# Patient Record
Sex: FEMALE | Race: WHITE | Employment: OTHER | URBAN - METROPOLITAN AREA
[De-identification: names, ages, dates, MRNs, and addresses within clinical notes are randomized per-mention and may not be internally consistent; named-entity substitution may affect disease eponyms.]

---

## 2021-06-17 ENCOUNTER — TRANSCRIBE ORDER (OUTPATIENT)
Dept: SCHEDULING | Age: 81
End: 2021-06-17

## 2021-06-17 DIAGNOSIS — R10.13 ABDOMINAL PAIN, EPIGASTRIC: Primary | ICD-10-CM

## 2021-06-24 ENCOUNTER — HOSPITAL ENCOUNTER (OUTPATIENT)
Dept: CT IMAGING | Age: 81
Discharge: HOME | End: 2021-06-24
Payer: MEDICARE

## 2021-06-24 DIAGNOSIS — R10.13 ABDOMINAL PAIN, EPIGASTRIC: ICD-10-CM

## 2021-06-24 PROCEDURE — 74160 CT ABDOMEN W/CONTRAST: CPT

## 2021-06-24 PROCEDURE — 74011000636 HC RX REV CODE- 636: Performed by: RADIOLOGY

## 2021-06-24 RX ADMIN — IOPAMIDOL 100 ML: 755 INJECTION, SOLUTION INTRAVENOUS at 13:30

## 2023-11-20 ENCOUNTER — HOSPITAL ENCOUNTER (OUTPATIENT)
Facility: HOSPITAL | Age: 83
Discharge: HOME | End: 2023-11-23
Payer: MEDICARE

## 2023-11-20 DIAGNOSIS — E78.5 HYPERLIPIDEMIA, UNSPECIFIED HYPERLIPIDEMIA TYPE: ICD-10-CM

## 2023-11-20 DIAGNOSIS — R94.5 ABNORMAL FINDING ON LIVER FUNCTION: ICD-10-CM

## 2023-11-20 DIAGNOSIS — K74.00 HEPATIC FIBROSIS, UNSPECIFIED: ICD-10-CM

## 2023-11-20 PROCEDURE — 76700 US EXAM ABDOM COMPLETE: CPT

## 2024-01-18 ENCOUNTER — TRANSCRIBE ORDERS (OUTPATIENT)
Facility: HOSPITAL | Age: 84
End: 2024-01-18

## 2024-01-18 DIAGNOSIS — R74.8 ACID PHOSPHATASE ELEVATED: Primary | ICD-10-CM

## 2024-01-26 ENCOUNTER — HOSPITAL ENCOUNTER (OUTPATIENT)
Facility: HOSPITAL | Age: 84
End: 2024-01-26
Attending: INTERNAL MEDICINE
Payer: MEDICARE

## 2024-01-26 DIAGNOSIS — R74.8 INCREASED LIVER ENZYMES: ICD-10-CM

## 2024-01-26 PROCEDURE — 76981 USE PARENCHYMA: CPT

## 2024-02-16 ENCOUNTER — HOSPITAL ENCOUNTER (OUTPATIENT)
Facility: HOSPITAL | Age: 84
End: 2024-02-16
Attending: INTERNAL MEDICINE
Payer: MEDICARE

## 2024-02-16 DIAGNOSIS — K74.00 LIVER FIBROSIS: ICD-10-CM

## 2024-02-16 PROCEDURE — 74183 MRI ABD W/O CNTR FLWD CNTR: CPT

## 2024-02-16 PROCEDURE — A9575 INJ GADOTERATE MEGLUMI 0.1ML: HCPCS | Performed by: INTERNAL MEDICINE

## 2024-02-16 PROCEDURE — 6360000004 HC RX CONTRAST MEDICATION: Performed by: INTERNAL MEDICINE

## 2024-02-16 RX ORDER — GADOTERATE MEGLUMINE 376.9 MG/ML
10 INJECTION INTRAVENOUS ONCE
Status: COMPLETED | OUTPATIENT
Start: 2024-02-16 | End: 2024-02-16

## 2024-02-16 RX ADMIN — GADOTERATE MEGLUMINE 10 ML: 376.9 INJECTION, SOLUTION INTRAVENOUS at 14:03

## 2024-11-27 ENCOUNTER — OFFICE VISIT (OUTPATIENT)
Age: 84
End: 2024-11-27

## 2024-11-27 VITALS
HEART RATE: 77 BPM | WEIGHT: 74.4 LBS | TEMPERATURE: 98.5 F | DIASTOLIC BLOOD PRESSURE: 65 MMHG | HEIGHT: 62 IN | OXYGEN SATURATION: 100 % | BODY MASS INDEX: 13.69 KG/M2 | SYSTOLIC BLOOD PRESSURE: 159 MMHG

## 2024-11-27 DIAGNOSIS — E03.9 PRIMARY HYPOTHYROIDISM: Primary | ICD-10-CM

## 2024-11-27 DIAGNOSIS — I10 PRIMARY HYPERTENSION: ICD-10-CM

## 2024-11-27 RX ORDER — LEVOTHYROXINE SODIUM 50 UG/1
TABLET ORAL
Qty: 90 TABLET | Refills: 3 | Status: SHIPPED | OUTPATIENT
Start: 2024-11-27 | End: 2024-11-27 | Stop reason: SDUPTHER

## 2024-11-27 RX ORDER — LEVOTHYROXINE SODIUM 50 UG/1
50 TABLET ORAL DAILY
Qty: 30 TABLET | Refills: 3 | Status: SHIPPED | OUTPATIENT
Start: 2024-11-27 | End: 2024-11-27 | Stop reason: SDUPTHER

## 2024-11-27 RX ORDER — LEVOTHYROXINE SODIUM 50 UG/1
50 TABLET ORAL DAILY
Qty: 90 TABLET | Refills: 3 | Status: SHIPPED | OUTPATIENT
Start: 2024-11-27 | End: 2024-11-27 | Stop reason: SDUPTHER

## 2024-11-27 RX ORDER — DENOSUMAB 60 MG/ML
INJECTION SUBCUTANEOUS
COMMUNITY

## 2024-11-27 RX ORDER — ACYCLOVIR 400 MG/1
TABLET ORAL
COMMUNITY

## 2024-11-27 RX ORDER — LEVOTHYROXINE SODIUM 50 UG/1
TABLET ORAL
Qty: 90 TABLET | Refills: 3 | Status: SHIPPED | OUTPATIENT
Start: 2024-11-27

## 2024-11-27 RX ORDER — IRON POLYSACCHARIDE COMPLEX 150 MG
1 CAPSULE ORAL
COMMUNITY

## 2024-11-27 NOTE — PROGRESS NOTES
Shauna Cunningham is a 84 y.o. female here for   Chief Complaint   Patient presents with    New Patient    Thyroid Problem       1. Have you been to the ER, urgent care clinic since your last visit?  Hospitalized since your last visit? -no    2. Have you seen or consulted any other health care providers outside of the [facility] since your last visit?  Include any pap smears or colon screening.-  no

## 2024-11-27 NOTE — PROGRESS NOTES
[facility] DIABETES AND ENDOCRINOLOGY               Maria Ines Avila MD        Patient Information  Name : Shauna Cunningham 84 y.o.     YOB: 1940         Referred by: Logan Sanchez MD       The patient (or guardian, if applicable) and other individuals in attendance with the patient were advised that Artificial Intelligence will be utilized during this visit to record, process the conversation to generate a clinical note, and support improvement of the AI technology. The patient (or guardian, if applicable) and other individuals in attendance at the appointment consented to the use of AI, including the recording.      Chief Complaint   Patient presents with    New Patient    Thyroid Problem       History of present illness    Shauna Cunningham is a 84 y.o. female  here for evaluation of thyroid.    History of Present Illness    She has managed hypothyroidism with levothyroxine for 20 years: 75 mcg four days a week and 50 mcg three days a week, taken on an empty stomach.    Experiencing weight loss attributed to sarcoidosis. Usual weight is 89 pounds, recently improved appetite, increasing vegetables, chicken, and fruits in diet.    History of sarcoidosis, currently in flare-up, managed with prednisone. Unsure about high calcium related to sarcoidosis. No nausea, vomiting, diarrhea, gluten sensitivity, celiac disease, or IBS. Mild ankle swelling.    FAMILY HISTORY  - Sister has thyroid problem  - No family history of thyroid cancer    Reviewed last 2 thyroid blood test, TSH has been high  No diarrhea, no known malabsorption  Did not miss the medications      Wt Readings from Last 3 Encounters:   11/27/24 33.7 kg (74 lb 6.4 oz)       Past Medical History:   Diagnosis Date    Hyperlipidemia     Hypertension        Current Outpatient Medications   Medication Sig    denosumab (PROLIA) 60 MG/ML SOSY SC injection Inject into the skin    iron polysaccharides (NIFEREX) 150 MG capsule 1 capsule    acyclovir

## 2025-01-02 ENCOUNTER — LAB (OUTPATIENT)
Age: 85
End: 2025-01-02

## 2025-01-02 DIAGNOSIS — E03.9 PRIMARY HYPOTHYROIDISM: ICD-10-CM

## 2025-01-03 PROBLEM — E78.5 HYPERLIPIDEMIA: Status: ACTIVE | Noted: 2025-01-03

## 2025-01-03 PROBLEM — I35.1 AORTIC VALVE REGURGITATION: Status: ACTIVE | Noted: 2025-01-03

## 2025-01-03 PROBLEM — I10 BENIGN ESSENTIAL HYPERTENSION: Status: ACTIVE | Noted: 2025-01-03

## 2025-01-03 PROBLEM — I31.39 PERICARDIAL EFFUSION: Status: ACTIVE | Noted: 2025-01-03

## 2025-01-03 PROBLEM — D86.0 PULMONARY SARCOIDOSIS (HCC): Status: ACTIVE | Noted: 2025-01-03

## 2025-01-03 PROBLEM — I25.10 CORONARY ARTERIOSCLEROSIS IN NATIVE ARTERY: Status: ACTIVE | Noted: 2025-01-03

## 2025-01-03 PROBLEM — K55.9 VASCULAR INSUFFICIENCY OF INTESTINE (HCC): Status: ACTIVE | Noted: 2025-01-03

## 2025-01-03 PROBLEM — E03.9 HYPOTHYROIDISM: Status: ACTIVE | Noted: 2025-01-03

## 2025-01-03 LAB
T4 FREE SERPL-MCNC: 0.9 NG/DL (ref 0.8–1.5)
TSH SERPL DL<=0.05 MIU/L-ACNC: 55.3 UIU/ML (ref 0.36–3.74)

## 2025-01-03 NOTE — RESULT ENCOUNTER NOTE
Has she taken the medication Unithroid brand name consistently as per prescription  If she has taken consistently then increase Unithroid to 100 mcg daily, she can double the dose of 50 mcg and we have to recheck the blood test after 4 weeks, TSH, free T4    If she has not taken the medication consistently, it is very important for her health to take the medication and then we have to repeat the blood test after 4 weeks

## 2025-01-06 ENCOUNTER — TELEPHONE (OUTPATIENT)
Age: 85
End: 2025-01-06

## 2025-01-06 NOTE — TELEPHONE ENCOUNTER
Per Dr. Avila, informed pt of result note, as noted above. Pt verbalized understanding and stated she did not have the Unithroid for a couple of weeks as the pharmacy was not able to get it in. She has an appt on Friday and will set up lab appt then.

## 2025-01-06 NOTE — TELEPHONE ENCOUNTER
----- Message from Dr. Maria Ines Avila MD sent at 1/3/2025  8:47 AM EST -----  Has she taken the medication Unithroid brand name consistently as per prescription  If she has taken consistently then increase Unithroid to 100 mcg daily, she can double the dose of 50 mcg and we have to recheck the blood test after 4 weeks, TSH, free T4    If she has not taken the medication consistently, it is very important for her health to take the medication and then we have to repeat the blood test after 4 weeks

## 2025-01-10 ENCOUNTER — OFFICE VISIT (OUTPATIENT)
Age: 85
End: 2025-01-10
Payer: MEDICARE

## 2025-01-10 VITALS
HEART RATE: 68 BPM | OXYGEN SATURATION: 100 % | BODY MASS INDEX: 14.17 KG/M2 | RESPIRATION RATE: 20 BRPM | TEMPERATURE: 97.5 F | WEIGHT: 77 LBS | HEIGHT: 62 IN

## 2025-01-10 DIAGNOSIS — E03.9 PRIMARY HYPOTHYROIDISM: ICD-10-CM

## 2025-01-10 DIAGNOSIS — E03.9 ACQUIRED HYPOTHYROIDISM: Primary | ICD-10-CM

## 2025-01-10 PROCEDURE — 99214 OFFICE O/P EST MOD 30 MIN: CPT | Performed by: INTERNAL MEDICINE

## 2025-01-10 NOTE — PROGRESS NOTES
Shauna Cunningham is a 84 y.o. female here for   Chief Complaint   Patient presents with    Thyroid Problem       1. Have you been to the ER, urgent care clinic since your last visit?  Hospitalized since your last visit? -no    2. Have you seen or consulted any other health care providers outside of the [facility] since your last visit?  Include any pap smears or colon screening.-no    
after 01/20/2025 to assess thyroid levels  - Continue Unithroid sublingually, ensure no generic substitution  - Lab slip provided, will contact with results  - Adjust dosage if thyroid hormone levels are insufficient      2. Sarcoidosis - Currently experiencing flare-up, managed with prednisone. Reports weight loss associated with sarcoidosis.    3.  Osteoporosis: Underweight  Risk factors, age, sarcoidosis, chronic prednisone  On denosumab managed by rheumatology per patient      4 HTN - cuff was too large            No follow-up provider specified.    Thank you for allowing me to participate in the care of this patient.    Maria Ines Avila MD      Patient /caregiver verbalized understanding  Voice-recognition software was used to generate this report, which may result in some phonetic-based errors in the grammar and contents.  Even though attempts were made to correct all the mistakes, some may have been missed and remained in the body of the report.

## 2025-01-11 LAB
T4 FREE SERPL-MCNC: 0.9 NG/DL (ref 0.8–1.5)
TSH SERPL DL<=0.05 MIU/L-ACNC: 81.4 UIU/ML (ref 0.36–3.74)

## 2025-01-14 ENCOUNTER — TELEPHONE (OUTPATIENT)
Age: 85
End: 2025-01-14

## 2025-01-14 DIAGNOSIS — E03.9 ACQUIRED HYPOTHYROIDISM: Primary | ICD-10-CM

## 2025-01-14 RX ORDER — LEVOTHYROXINE SODIUM 100 UG/1
100 TABLET ORAL DAILY
Qty: 90 TABLET | Refills: 3 | Status: SHIPPED | OUTPATIENT
Start: 2025-01-14

## 2025-01-14 NOTE — TELEPHONE ENCOUNTER
----- Message from Dr. Maria Ines Avila MD sent at 1/13/2025  4:54 PM EST -----  Not sure what is going on with the thyroid, she reports to be taking the Unithroid however TSH continues to increase    Please call the pharmacy to see if she got Unithroid 50 mcg, brand      If she got 50 mcg then I have to increase to 100 mcg Unithroid

## 2025-01-14 NOTE — TELEPHONE ENCOUNTER
Per Dr. Avila, informed pt of result note, as noted above. Pt verbalized understanding with no further questions or concerns at this time.

## 2025-01-14 NOTE — TELEPHONE ENCOUNTER
Called [facility] and confirmed pt is received brand Unithroid 50 mcg tab.     Attempted to call pt. Answer, but no response? Will send [employer] msg.

## 2025-01-17 ENCOUNTER — TELEPHONE (OUTPATIENT)
Age: 85
End: 2025-01-17

## 2025-01-17 DIAGNOSIS — E03.9 ACQUIRED HYPOTHYROIDISM: Primary | ICD-10-CM

## 2025-01-17 NOTE — TELEPHONE ENCOUNTER
Attempted to call. Unsuccessful. Left msg for Shauna Cunningham to give us a call back at the office. A callback number was left.

## 2025-01-17 NOTE — TELEPHONE ENCOUNTER
Pt stated legs and feet are swelling and BP is running very high. Denies chest pain. Stated the swelling started before she double the 50 mcg Unithroid tab, but the BP didn't increase until after. Would like to speak to Dr. Avila. Advised her she is in clinic and will call after. Pt advised to seek ED should sxs worsen or she develops any chest pain. Pt verbalized understanding with no further questions or concerns at this time.

## 2025-01-17 NOTE — TELEPHONE ENCOUNTER
Increased unithroid and now legs and feet are swelling and states blood pressure is elevated. Asked what her reading was could only state systolic is around 170

## 2025-01-17 NOTE — TELEPHONE ENCOUNTER
She is not getting enough thyroid medicine based on the blood test, hence we had to increase the dose    How high is the blood pressure - need BP as well as pulse readings for 2 days ? Does she live alone ? Compliance of the medications ?    Confirm the blood pressure medication she is on, (chlorthalidone 25 mg, atenolol half a tablet twice daily, amlodipine usually is given once a day not 3 times a day)

## 2025-01-20 RX ORDER — LEVOTHYROXINE SODIUM 100 UG/1
100 TABLET ORAL DAILY
Qty: 90 TABLET | Refills: 2 | Status: SHIPPED | OUTPATIENT
Start: 2025-01-20

## 2025-01-20 NOTE — TELEPHONE ENCOUNTER
She wants to go back on LT4 , not unithroid   BP is better  She is on Amlodipine 2.5 mg TID , says that is how she was instructed to take as she could not take 10 mg   - could cause pedal edema    Stockings    Switch to Levothyroxine     TSH ,Ft4 , T3 in 4 weeks   Not absorbing well

## 2025-01-20 NOTE — TELEPHONE ENCOUNTER
Pt stated she is still having the swelling, BP has slightly approved but she cannot provide exact number. Taking Amlodipine 25 mg TID, Chlorthalidone 25 mg daily and Atenolol 25 mg 1/2 tab BID. Pt wants to know if she can take Levothyroxine in the higher dose because Unithroid was causing some swelling when she first switched to it and now that she has increased the dose the swelling is worse and she is having skin sensitivity as well.

## 2025-02-25 ENCOUNTER — HOSPITAL ENCOUNTER (OUTPATIENT)
Facility: HOSPITAL | Age: 85
Discharge: HOME | End: 2025-02-27
Payer: MEDICARE

## 2025-02-25 VITALS
WEIGHT: 77 LBS | SYSTOLIC BLOOD PRESSURE: 150 MMHG | BODY MASS INDEX: 14.17 KG/M2 | HEIGHT: 62 IN | DIASTOLIC BLOOD PRESSURE: 70 MMHG

## 2025-02-25 DIAGNOSIS — I50.9 HEART FAILURE, UNSPECIFIED HF CHRONICITY, UNSPECIFIED HEART FAILURE TYPE (HCC): ICD-10-CM

## 2025-02-25 PROCEDURE — 93306 TTE W/DOPPLER COMPLETE: CPT

## 2025-02-27 ENCOUNTER — TELEPHONE (OUTPATIENT)
Age: 85
End: 2025-02-27

## 2025-02-27 ENCOUNTER — OFFICE VISIT (OUTPATIENT)
Age: 85
End: 2025-02-27
Payer: MEDICARE

## 2025-02-27 VITALS
DIASTOLIC BLOOD PRESSURE: 72 MMHG | HEART RATE: 76 BPM | SYSTOLIC BLOOD PRESSURE: 130 MMHG | OXYGEN SATURATION: 97 % | HEIGHT: 62 IN | BODY MASS INDEX: 13.98 KG/M2 | WEIGHT: 76 LBS

## 2025-02-27 DIAGNOSIS — I50.32 CHRONIC DIASTOLIC HEART FAILURE (HCC): Primary | ICD-10-CM

## 2025-02-27 DIAGNOSIS — I70.90 ATHEROSCLEROSIS: ICD-10-CM

## 2025-02-27 DIAGNOSIS — I50.9 HEART FAILURE, UNSPECIFIED HF CHRONICITY, UNSPECIFIED HEART FAILURE TYPE (HCC): ICD-10-CM

## 2025-02-27 DIAGNOSIS — E78.5 HYPERLIPIDEMIA, UNSPECIFIED HYPERLIPIDEMIA TYPE: ICD-10-CM

## 2025-02-27 DIAGNOSIS — I31.39 PERICARDIAL EFFUSION: ICD-10-CM

## 2025-02-27 LAB
ECHO AO ASC DIAM: 2.3 CM
ECHO AO ASCENDING AORTA INDEX: 1.81 CM/M2
ECHO AO ROOT DIAM: 2.6 CM
ECHO AO ROOT INDEX: 2.05 CM/M2
ECHO AR MAX VEL PISA: 2.3 M/S
ECHO AV AREA PEAK VELOCITY: 0.6 CM2
ECHO AV AREA VTI: 0.7 CM2
ECHO AV AREA/BSA PEAK VELOCITY: 0.5 CM2/M2
ECHO AV AREA/BSA VTI: 0.6 CM2/M2
ECHO AV MEAN GRADIENT: 8 MMHG
ECHO AV MEAN VELOCITY: 1.3 M/S
ECHO AV PEAK GRADIENT: 14 MMHG
ECHO AV PEAK VELOCITY: 1.9 M/S
ECHO AV REGURGITANT PHT: 247.9 MS
ECHO AV VELOCITY RATIO: 0.42
ECHO AV VTI: 48.3 CM
ECHO BSA: 1.24 M2
ECHO LA DIAMETER INDEX: 2.36 CM/M2
ECHO LA DIAMETER: 3 CM
ECHO LA TO AORTIC ROOT RATIO: 1.15
ECHO LA VOL A-L A2C: 48 ML (ref 22–52)
ECHO LA VOL A-L A4C: 37 ML (ref 22–52)
ECHO LA VOL BP: 43 ML (ref 22–52)
ECHO LA VOL MOD A2C: 46 ML (ref 22–52)
ECHO LA VOL MOD A4C: 30 ML (ref 22–52)
ECHO LA VOL/BSA BIPLANE: 34 ML/M2 (ref 16–34)
ECHO LA VOLUME AREA LENGTH: 48 ML
ECHO LA VOLUME INDEX A-L A2C: 38 ML/M2 (ref 16–34)
ECHO LA VOLUME INDEX A-L A4C: 29 ML/M2 (ref 16–34)
ECHO LA VOLUME INDEX AREA LENGTH: 38 ML/M2 (ref 16–34)
ECHO LA VOLUME INDEX MOD A2C: 36 ML/M2 (ref 16–34)
ECHO LA VOLUME INDEX MOD A4C: 24 ML/M2 (ref 16–34)
ECHO LV E' LATERAL VELOCITY: 5.35 CM/S
ECHO LV E' SEPTAL VELOCITY: 4.18 CM/S
ECHO LV EDV A2C: 68 ML
ECHO LV EDV A4C: 35 ML
ECHO LV EDV BP: 55 ML (ref 56–104)
ECHO LV EDV INDEX A4C: 28 ML/M2
ECHO LV EDV INDEX BP: 43 ML/M2
ECHO LV EDV NDEX A2C: 54 ML/M2
ECHO LV EF PHYSICIAN: 55 %
ECHO LV EJECTION FRACTION A2C: 83 %
ECHO LV EJECTION FRACTION A4C: 39 %
ECHO LV EJECTION FRACTION BIPLANE: 70 % (ref 55–100)
ECHO LV ESV A2C: 12 ML
ECHO LV ESV A4C: 22 ML
ECHO LV ESV BP: 17 ML (ref 19–49)
ECHO LV ESV INDEX A2C: 9 ML/M2
ECHO LV ESV INDEX A4C: 17 ML/M2
ECHO LV ESV INDEX BP: 13 ML/M2
ECHO LV FRACTIONAL SHORTENING: 30 % (ref 28–44)
ECHO LV INTERNAL DIMENSION DIASTOLE INDEX: 3.15 CM/M2
ECHO LV INTERNAL DIMENSION DIASTOLIC: 4 CM (ref 3.9–5.3)
ECHO LV INTERNAL DIMENSION SYSTOLIC INDEX: 2.2 CM/M2
ECHO LV INTERNAL DIMENSION SYSTOLIC: 2.8 CM
ECHO LV IVSD: 1.3 CM (ref 0.6–0.9)
ECHO LV MASS 2D: 145.6 G (ref 67–162)
ECHO LV MASS INDEX 2D: 114.7 G/M2 (ref 43–95)
ECHO LV POSTERIOR WALL DIASTOLIC: 0.9 CM (ref 0.6–0.9)
ECHO LV RELATIVE WALL THICKNESS RATIO: 0.45
ECHO LVOT AREA: 1.5 CM2
ECHO LVOT AV VTI INDEX: 0.5
ECHO LVOT DIAM: 1.4 CM
ECHO LVOT MEAN GRADIENT: 2 MMHG
ECHO LVOT PEAK GRADIENT: 3 MMHG
ECHO LVOT PEAK VELOCITY: 0.8 M/S
ECHO LVOT STROKE VOLUME INDEX: 29.4 ML/M2
ECHO LVOT SV: 37.4 ML
ECHO LVOT VTI: 24.3 CM
ECHO MV A VELOCITY: 0.94 M/S
ECHO MV E DECELERATION TIME (DT): 138.5 MS
ECHO MV E VELOCITY: 1.13 M/S
ECHO MV E/A RATIO: 1.2
ECHO MV E/E' LATERAL: 21.12
ECHO MV E/E' RATIO (AVERAGED): 24.08
ECHO MV E/E' SEPTAL: 27.03
ECHO PULMONARY ARTERY END DIASTOLIC PRESSURE: 7 MMHG
ECHO PV MAX VELOCITY: 1.1 M/S
ECHO PV PEAK GRADIENT: 4 MMHG
ECHO PV REGURGITANT MAX VELOCITY: 1.3 M/S
ECHO RV FREE WALL PEAK S': 10.1 CM/S
ECHO RV TAPSE: 1.9 CM (ref 1.7–?)
ECHO TV REGURGITANT MAX VELOCITY: 2.05 M/S
ECHO TV REGURGITANT PEAK GRADIENT: 21 MMHG

## 2025-02-27 PROCEDURE — 1090F PRES/ABSN URINE INCON ASSESS: CPT | Performed by: SPECIALIST

## 2025-02-27 PROCEDURE — 1123F ACP DISCUSS/DSCN MKR DOCD: CPT | Performed by: SPECIALIST

## 2025-02-27 PROCEDURE — 1036F TOBACCO NON-USER: CPT | Performed by: SPECIALIST

## 2025-02-27 PROCEDURE — G8419 CALC BMI OUT NRM PARAM NOF/U: HCPCS | Performed by: SPECIALIST

## 2025-02-27 PROCEDURE — 99204 OFFICE O/P NEW MOD 45 MIN: CPT | Performed by: SPECIALIST

## 2025-02-27 PROCEDURE — 93005 ELECTROCARDIOGRAM TRACING: CPT | Performed by: SPECIALIST

## 2025-02-27 PROCEDURE — G8427 DOCREV CUR MEDS BY ELIG CLIN: HCPCS | Performed by: SPECIALIST

## 2025-02-27 PROCEDURE — 93010 ELECTROCARDIOGRAM REPORT: CPT | Performed by: SPECIALIST

## 2025-02-27 PROCEDURE — 3078F DIAST BP <80 MM HG: CPT | Performed by: SPECIALIST

## 2025-02-27 PROCEDURE — 1159F MED LIST DOCD IN RCRD: CPT | Performed by: SPECIALIST

## 2025-02-27 PROCEDURE — 3075F SYST BP GE 130 - 139MM HG: CPT | Performed by: SPECIALIST

## 2025-02-27 PROCEDURE — 1126F AMNT PAIN NOTED NONE PRSNT: CPT | Performed by: SPECIALIST

## 2025-02-27 PROCEDURE — G8400 PT W/DXA NO RESULTS DOC: HCPCS | Performed by: SPECIALIST

## 2025-02-27 RX ORDER — BUMETANIDE 0.5 MG/1
0.5 TABLET ORAL DAILY
COMMUNITY

## 2025-02-27 NOTE — PROGRESS NOTES
Chief Complaint   Patient presents with    HF     Vitals:    02/27/25 1541   BP: 130/72   Site: Left Upper Arm   Position: Sitting   Pulse: 76   SpO2: 97%   Weight: 34.5 kg (76 lb)   Height: 1.575 m (5' 2\")       Chest pain NO     ER, urgent care, or hospitalized outside of [facility] since your last visit?  NO     Refills NO

## 2025-02-27 NOTE — PATIENT INSTRUCTIONS
Patient Education        Learning About the Mediterranean Diet  What is the Mediterranean diet?     The Mediterranean diet is a style of eating rather than a diet plan. It features foods eaten in [place], [place], southern [place] and [place], and other countries along the [place] Sea. It emphasizes eating foods like fish, fruits, vegetables, beans, high-fiber breads and whole grains, nuts, and olive oil. This style of eating includes limited red meat, cheese, and sweets.  Why choose the Mediterranean diet?  A Mediterranean-style diet may improve heart health. It contains more fat than other heart-healthy diets. But the fats are mainly from nuts, unsaturated oils (such as fish oils and olive oil), and certain nut or seed oils (such as canola, soybean, or flaxseed oil). These fats may help protect the heart and blood vessels.  How can you get started on the Mediterranean diet?  Here are some things you can do to switch to a more Mediterranean way of eating.  What to eat  Eat a variety of fruits and vegetables each day, such as grapes, blueberries, tomatoes, broccoli, peppers, figs, olives, spinach, eggplant, beans, lentils, and chickpeas.  Eat a variety of whole-grain foods each day, such as oats, brown rice, and whole wheat bread, pasta, and couscous.  Eat fish at least 2 times a week. Try tuna, salmon, mackerel, lake trout, herring, or sardines.  Eat moderate amounts of low-fat dairy products, such as milk, cheese, or yogurt.  Eat moderate amounts of poultry and eggs.  Choose healthy (unsaturated) fats, such as nuts, olive oil, and certain nut or seed oils like canola, soybean, and flaxseed.  Limit unhealthy (saturated) fats, such as butter, palm oil, and coconut oil. And limit fats found in animal products, such as meat and dairy products made with whole milk. Try to eat red meat only a few times a month in very small amounts.  Limit sweets and desserts to only a few times a week. This includes sugar-sweetened

## 2025-02-27 NOTE — TELEPHONE ENCOUNTER
Spoke to daughter in law she is going to reach out to Shauna to see if she can come in at 4 today or 3/6. Waiting for patient to call back to which appt. She preferred. Trying to fit her in earlier due to echo results. Holding March 6  until we hear back from pt on decision.

## 2025-02-27 NOTE — PROGRESS NOTES
Silvano Aparicio MD. [facility]          Patient: Shauna Cunningham  : 1940      Today's Date: 2025        HISTORY OF PRESENT ILLNESS:     History of Present Illness:  Referred for pericardial effusion   Was seeing Dr. Gongora.    Had a flare of pulmonary sarcoid and is dragging afterwards - took steroids.   Breathing is overall OK.  No sig SOB.  Class 2 SHORE.    No orthopnea.        PAST MEDICAL HISTORY:     Past Medical History:   Diagnosis Date    (HFpEF) heart failure with preserved ejection fraction (HCC)     Atherosclerosis     Hyperlipidemia     Hypertension     Hypothyroidism     PAD (peripheral artery disease)     Pericardial effusion     Pulmonary sarcoidosis              CURRENT MEDICATIONS:    .  Current Outpatient Medications   Medication Sig Dispense Refill    bumetanide (BUMEX) 0.5 MG tablet Take 1 tablet by mouth daily      levothyroxine (SYNTHROID) 100 MCG tablet Take 1 tablet by mouth daily 90 tablet 2    denosumab (PROLIA) 60 MG/ML SOSY SC injection Inject into the skin      iron polysaccharides (NIFEREX) 150 MG capsule 1 capsule      acyclovir (ZOVIRAX) 400 MG tablet Take 1 tablet by mouth as needed      co-enzyme Q-10 30 MG CAPS capsule Take 3 capsules by mouth daily      ammonium lactate (AMLACTIN) 12 % cream Apply 1 Application topically as needed      Evolocumab (REPATHA) SOSY syringe Inject 1 mL into the skin every 30 days      aspirin 81 MG chewable tablet Take 1 tablet by mouth Every Day      vitamin D (VITAMIN D3) 25 MCG (1000 UT) CAPS Take 1 tablet by mouth Every Day      rosuvastatin (CRESTOR) 10 MG tablet Take 1 tablet by mouth nightly      amLODIPine (NORVASC) 2.5 MG tablet 1 tablet Orally THREE TIMES A DAY for 90 days      atenolol (TENORMIN) 25 MG tablet Take 0.5 tablets by mouth 2 times daily at 0800 and 1400      chlorthalidone (HYGROTON) 25 MG tablet Take 1 tablet by mouth daily       No current facility-administered medications for this visit.       Allergies   Allergen

## 2025-03-06 ENCOUNTER — ANCILLARY PROCEDURE (OUTPATIENT)
Age: 85
End: 2025-03-06
Payer: MEDICARE

## 2025-03-06 VITALS
SYSTOLIC BLOOD PRESSURE: 130 MMHG | WEIGHT: 72 LBS | DIASTOLIC BLOOD PRESSURE: 72 MMHG | HEIGHT: 62 IN | BODY MASS INDEX: 13.25 KG/M2

## 2025-03-06 DIAGNOSIS — E78.5 HYPERLIPIDEMIA, UNSPECIFIED HYPERLIPIDEMIA TYPE: ICD-10-CM

## 2025-03-06 DIAGNOSIS — I31.39 PERICARDIAL EFFUSION: ICD-10-CM

## 2025-03-06 DIAGNOSIS — I50.9 HEART FAILURE, UNSPECIFIED HF CHRONICITY, UNSPECIFIED HEART FAILURE TYPE (HCC): ICD-10-CM

## 2025-03-06 DIAGNOSIS — I50.32 CHRONIC DIASTOLIC HEART FAILURE (HCC): ICD-10-CM

## 2025-03-06 DIAGNOSIS — I70.90 ATHEROSCLEROSIS: ICD-10-CM

## 2025-03-06 LAB
ECHO BSA: 1.2 M2
PREALB SERPL-MCNC: 22 MG/DL (ref 9–32)
STRESS TARGET HR: 135 BPM

## 2025-03-06 PROCEDURE — A9538 TC99M PYROPHOSPHATE: HCPCS | Performed by: INTERNAL MEDICINE

## 2025-03-06 RX ADMIN — Medication 16.4 MILLICURIE: at 10:30

## 2025-03-10 ENCOUNTER — RESULTS FOLLOW-UP (OUTPATIENT)
Age: 85
End: 2025-03-10

## 2025-03-10 LAB
ALBUMIN SERPL ELPH-MCNC: 4.2 G/DL (ref 2.9–4.4)
ALBUMIN/GLOB SERPL: 1.3 {RATIO} (ref 0.7–1.7)
ALPHA1 GLOB SERPL ELPH-MCNC: 0.4 G/DL (ref 0–0.4)
ALPHA2 GLOB SERPL ELPH-MCNC: 1 G/DL (ref 0.4–1)
B-GLOBULIN SERPL ELPH-MCNC: 1.1 G/DL (ref 0.7–1.3)
GAMMA GLOB SERPL ELPH-MCNC: 1.1 G/DL (ref 0.4–1.8)
GLOBULIN SER-MCNC: 3.5 G/DL (ref 2.2–3.9)
IGA SERPL-MCNC: 122 MG/DL (ref 64–422)
IGG SERPL-MCNC: 1151 MG/DL (ref 586–1602)
IGM SERPL-MCNC: 41 MG/DL (ref 26–217)
INTERPRETATION SERPL IEP-IMP: ABNORMAL
KAPPA LC FREE SER-MCNC: 24.1 MG/L (ref 3.3–19.4)
KAPPA LC FREE/LAMBDA FREE SER: 1.45 {RATIO} (ref 0.26–1.65)
LABORATORY COMMENT REPORT: ABNORMAL
LAMBDA LC FREE SERPL-MCNC: 16.6 MG/L (ref 5.7–26.3)
M PROTEIN SERPL ELPH-MCNC: ABNORMAL G/DL
PROT SERPL-MCNC: 7.7 G/DL (ref 6–8.5)

## 2025-03-11 ENCOUNTER — OFFICE VISIT (OUTPATIENT)
Age: 85
End: 2025-03-11
Payer: MEDICARE

## 2025-03-11 VITALS
WEIGHT: 76 LBS | TEMPERATURE: 98.6 F | HEIGHT: 62 IN | RESPIRATION RATE: 20 BRPM | BODY MASS INDEX: 13.98 KG/M2 | SYSTOLIC BLOOD PRESSURE: 97 MMHG | DIASTOLIC BLOOD PRESSURE: 73 MMHG | HEART RATE: 70 BPM | OXYGEN SATURATION: 98 %

## 2025-03-11 DIAGNOSIS — E03.9 ACQUIRED HYPOTHYROIDISM: Primary | ICD-10-CM

## 2025-03-11 DIAGNOSIS — I10 ESSENTIAL HYPERTENSION: ICD-10-CM

## 2025-03-11 PROCEDURE — G8400 PT W/DXA NO RESULTS DOC: HCPCS | Performed by: INTERNAL MEDICINE

## 2025-03-11 PROCEDURE — 99214 OFFICE O/P EST MOD 30 MIN: CPT | Performed by: INTERNAL MEDICINE

## 2025-03-11 PROCEDURE — 1090F PRES/ABSN URINE INCON ASSESS: CPT | Performed by: INTERNAL MEDICINE

## 2025-03-11 PROCEDURE — G8427 DOCREV CUR MEDS BY ELIG CLIN: HCPCS | Performed by: INTERNAL MEDICINE

## 2025-03-11 PROCEDURE — 1159F MED LIST DOCD IN RCRD: CPT | Performed by: INTERNAL MEDICINE

## 2025-03-11 PROCEDURE — 1160F RVW MEDS BY RX/DR IN RCRD: CPT | Performed by: INTERNAL MEDICINE

## 2025-03-11 PROCEDURE — 1036F TOBACCO NON-USER: CPT | Performed by: INTERNAL MEDICINE

## 2025-03-11 PROCEDURE — 1123F ACP DISCUSS/DSCN MKR DOCD: CPT | Performed by: INTERNAL MEDICINE

## 2025-03-11 PROCEDURE — 1126F AMNT PAIN NOTED NONE PRSNT: CPT | Performed by: INTERNAL MEDICINE

## 2025-03-11 PROCEDURE — G8419 CALC BMI OUT NRM PARAM NOF/U: HCPCS | Performed by: INTERNAL MEDICINE

## 2025-03-11 PROCEDURE — G2211 COMPLEX E/M VISIT ADD ON: HCPCS | Performed by: INTERNAL MEDICINE

## 2025-03-11 PROCEDURE — 3074F SYST BP LT 130 MM HG: CPT | Performed by: INTERNAL MEDICINE

## 2025-03-11 PROCEDURE — 3078F DIAST BP <80 MM HG: CPT | Performed by: INTERNAL MEDICINE

## 2025-03-11 NOTE — PROGRESS NOTES
Shauna Cunningham is a 85 y.o. female here for   Chief Complaint   Patient presents with    Thyroid Problem       1. Have you been to the ER, urgent care clinic since your last visit?  Hospitalized since your last visit? -no    2. Have you seen or consulted any other health care providers outside of the [facility] since your last visit?  Include any pap smears or colon screening.-PCP

## 2025-03-11 NOTE — TELEPHONE ENCOUNTER
Called pt,    Discussed the following findings,  Per Dr. Silvano Aparicio: \"Can you please let patient know that labs look good overall.  The Free Kappa Light chains is just mildly elevated and seems to be within normal limits for her age and GFR.  I could refer her to Hematology if she wants a formal assessment, however labs overall seem to be within normal limits.\"    She already sees Hematology,  Dr Richy Baez   [facility]   [place]   Phone: [phone]   Fax: [phone]    Faxed lab results to PCP & [facility]    Asking about results for PYP scan; not yet resulted.  She expressed understanding.

## 2025-03-11 NOTE — PROGRESS NOTES
[facility] DIABETES AND ENDOCRINOLOGY               Maria Ines Avila MD        Patient Information  Name : Shauna Cunningham 85 y.o.     YOB: 1940         Referred by: Logan Sanchez MD       The patient (or guardian, if applicable) and other individuals in attendance with the patient were advised that Artificial Intelligence will be utilized during this visit to record, process the conversation to generate a clinical note, and support improvement of the AI technology. The patient (or guardian, if applicable) and other individuals in attendance at the appointment consented to the use of AI, including the recording.      Chief Complaint   Patient presents with    Thyroid Problem         Shauna Cunningham is a 85 y.o.     History of Present Illness    She has managed hypothyroidism with levothyroxine for 20 years: 75 mcg four days a week and 50 mcg three days a week, taken on an empty stomach.  Now on Levothyroixine    Could not tolerate Unithroid    Adheres to daily levothyroxine without improvement dose increased to 100 mcg daily now TSH normal  Questionable malabsorption  No diarrhea    FAMILY HISTORY  - Sister and mother had thyroid issues    MEDICATIONS  Levothyroxine          Wt Readings from Last 3 Encounters:   03/11/25 34.5 kg (76 lb)   03/06/25 32.7 kg (72 lb)   02/25/25 34.9 kg (77 lb)       Past Medical History:   Diagnosis Date    (HFpEF) heart failure with preserved ejection fraction (HCC)     Atherosclerosis     Hyperlipidemia     Hypertension     Hypothyroidism     PAD (peripheral artery disease)     Pericardial effusion     Pulmonary sarcoidosis        Current Outpatient Medications   Medication Sig    levothyroxine (SYNTHROID) 100 MCG tablet Take 1 tablet by mouth daily    denosumab (PROLIA) 60 MG/ML SOSY SC injection Inject into the skin    iron polysaccharides (NIFEREX) 150 MG capsule 1 capsule    acyclovir (ZOVIRAX) 400 MG tablet Take 1 tablet by mouth as needed    Coenzyme Q10 (COQ10) 100

## 2025-04-03 LAB
ECHO BSA: 1.23 M2
NUC 3 HOUR HEART TO CONTRALATERAL RATIO: 1.4
STRESS TARGET HR: 135 BPM

## 2025-04-11 ENCOUNTER — TELEPHONE (OUTPATIENT)
Age: 85
End: 2025-04-11

## 2025-04-11 DIAGNOSIS — E78.5 HYPERLIPIDEMIA, UNSPECIFIED HYPERLIPIDEMIA TYPE: ICD-10-CM

## 2025-04-11 DIAGNOSIS — I50.32 CHRONIC DIASTOLIC HEART FAILURE (HCC): Primary | ICD-10-CM

## 2025-04-11 DIAGNOSIS — E85.82 WILD-TYPE TRANSTHYRETIN-RELATED (ATTR) AMYLOIDOSIS (HCC): ICD-10-CM

## 2025-04-11 DIAGNOSIS — I70.90 ATHEROSCLEROSIS: ICD-10-CM

## 2025-04-11 DIAGNOSIS — I31.39 PERICARDIAL EFFUSION: ICD-10-CM

## 2025-04-11 DIAGNOSIS — I50.9 HEART FAILURE, UNSPECIFIED HF CHRONICITY, UNSPECIFIED HEART FAILURE TYPE (HCC): ICD-10-CM

## 2025-04-11 NOTE — TELEPHONE ENCOUNTER
Spoke to pt,  Confirmed ID x2, relayed results & provided dx, ahf's phone number.    Per Dr. Silvano Aparicio: \"Macy - can you please call patient and refer to Advanced HF team.     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      Please refer to [facility]\"    Pt expressed understanding of plan.

## 2025-04-14 ENCOUNTER — TELEPHONE (OUTPATIENT)
Age: 85
End: 2025-04-14

## 2025-04-14 ASSESSMENT — PATIENT HEALTH QUESTIONNAIRE - PHQ9
SUM OF ALL RESPONSES TO PHQ QUESTIONS 1-9: 0
SUM OF ALL RESPONSES TO PHQ QUESTIONS 1-9: 0
2. FEELING DOWN, DEPRESSED OR HOPELESS: NOT AT ALL
SUM OF ALL RESPONSES TO PHQ QUESTIONS 1-9: 0
1. LITTLE INTEREST OR PLEASURE IN DOING THINGS: NOT AT ALL
1. LITTLE INTEREST OR PLEASURE IN DOING THINGS: NOT AT ALL
2. FEELING DOWN, DEPRESSED OR HOPELESS: NOT AT ALL
SUM OF ALL RESPONSES TO PHQ9 QUESTIONS 1 & 2: 0
SUM OF ALL RESPONSES TO PHQ QUESTIONS 1-9: 0

## 2025-04-14 NOTE — TELEPHONE ENCOUNTER
New Patient referred by Dr. Aparicio . Patient was given the address to [facility] and directions to clinic.  Patient was advised to arrive 15 minutes early for registration, bring medication bottles, as well as insurance cards and ID. Patient was provided the clinic phone number for any questions, in addition to my extension. Patient is agreeable and understanding of all instructions with no further questions or concerns at this time.    Insurance was verified and patient was scheduled with Dr. Cadet.     No additional records requested at this time.     Ernestina Sorensen, CMA

## 2025-04-15 NOTE — PROGRESS NOTES
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 female with a history of HFpEF and recent PYP scan suggesting TTR amyloid.     PLAN:  Heart failure/Cardiomyopathy:  NYHA II-III  Continue current medical therapy for heart failure:  ACE/ARB/ARNi: Generally not well tolerated in amyloid, will hold for now  MRA: May add next visit  SGLT2 inhibitor: Start Jardiance 10 mg daily  Diuretic: Continue Bumex 0.5 mg PRN  Reinforced low salt diet  Reinforced fluid restriction to 6 x 8oz glasses per day  Recommended 30 minutes of aerobic exercise 5 days weekly  Labs: BMP, proBNP, Troponin and prealbumin    TTR amyloid:  Positive PYP scan by visual score but not HCL ratio  Will get Cardiac MRI to confirm. Can get false positive results in the setting of CAD  She does have significant elevation in BNP and chronic pericardial effusion which can be associated with amyloid  Genetic testing today  Start Vyndamax 61 mg daily  Yearly holter/event monitoring to evaluate for A-fib, 7 day monitor ordered today  Supplements for management of TTR amyloid discussed with patient  - Green tea extract (epigallocatechin-3-gallate [EGCG]) may prevent fibril formation and can be taken with a vitamin C supplement (which improves absorption of the green tea). Recommended green tea extract dose is 315 mg up to twice daily.   - Turmeric has been suggested to have stabilizing effect on

## 2025-04-16 ENCOUNTER — OFFICE VISIT (OUTPATIENT)
Age: 85
End: 2025-04-16
Payer: MEDICARE

## 2025-04-16 VITALS
WEIGHT: 75.8 LBS | DIASTOLIC BLOOD PRESSURE: 70 MMHG | TEMPERATURE: 97.3 F | SYSTOLIC BLOOD PRESSURE: 180 MMHG | BODY MASS INDEX: 13.95 KG/M2 | HEIGHT: 62 IN | RESPIRATION RATE: 16 BRPM | HEART RATE: 72 BPM | OXYGEN SATURATION: 96 %

## 2025-04-16 DIAGNOSIS — I43 AMYLOID HEART DISEASE (HCC): ICD-10-CM

## 2025-04-16 DIAGNOSIS — I50.32 CHRONIC DIASTOLIC HEART FAILURE (HCC): Primary | ICD-10-CM

## 2025-04-16 DIAGNOSIS — E85.4 AMYLOID HEART DISEASE (HCC): ICD-10-CM

## 2025-04-16 PROCEDURE — G8419 CALC BMI OUT NRM PARAM NOF/U: HCPCS | Performed by: INTERNAL MEDICINE

## 2025-04-16 PROCEDURE — 1123F ACP DISCUSS/DSCN MKR DOCD: CPT | Performed by: INTERNAL MEDICINE

## 2025-04-16 PROCEDURE — 1126F AMNT PAIN NOTED NONE PRSNT: CPT | Performed by: INTERNAL MEDICINE

## 2025-04-16 PROCEDURE — G8427 DOCREV CUR MEDS BY ELIG CLIN: HCPCS | Performed by: INTERNAL MEDICINE

## 2025-04-16 PROCEDURE — G8400 PT W/DXA NO RESULTS DOC: HCPCS | Performed by: INTERNAL MEDICINE

## 2025-04-16 PROCEDURE — 3078F DIAST BP <80 MM HG: CPT | Performed by: INTERNAL MEDICINE

## 2025-04-16 PROCEDURE — 3075F SYST BP GE 130 - 139MM HG: CPT | Performed by: INTERNAL MEDICINE

## 2025-04-16 PROCEDURE — 99205 OFFICE O/P NEW HI 60 MIN: CPT | Performed by: INTERNAL MEDICINE

## 2025-04-16 PROCEDURE — 1036F TOBACCO NON-USER: CPT | Performed by: INTERNAL MEDICINE

## 2025-04-16 PROCEDURE — 1090F PRES/ABSN URINE INCON ASSESS: CPT | Performed by: INTERNAL MEDICINE

## 2025-04-16 PROCEDURE — 1159F MED LIST DOCD IN RCRD: CPT | Performed by: INTERNAL MEDICINE

## 2025-04-16 RX ORDER — TAFAMIDIS 61 MG/1
61 CAPSULE, LIQUID FILLED ORAL DAILY
Qty: 90 CAPSULE | Refills: 1 | Status: SHIPPED | OUTPATIENT
Start: 2025-04-16 | End: 2025-04-18

## 2025-04-16 NOTE — PROGRESS NOTES
Chief Complaint   Patient presents with    New Patient     Chronic diastolic heart failure    Leg Swelling     Primarily ankles, also feet and thighs    Shortness of Breath        BP (!) 180/70 (BP Site: Left Upper Arm, Patient Position: Sitting, BP Cuff Size: Small Adult)   Pulse 72   Temp 97.3 °F (36.3 °C) (Oral)   Resp 16   Ht 1.575 m (5' 2.01\")   Wt 34.4 kg (75 lb 12.8 oz)   SpO2 96%   BMI 13.86 kg/m²      1. Have you been to the ER, urgent care clinic since your last visit?  Hospitalized since your last visit?No    2. Have you seen or consulted any other health care providers outside of the [facility] since your last visit?  Include any pap smears or colon screening. No

## 2025-04-16 NOTE — PROGRESS NOTES
PA completed via [facility] meds[facility]    Genetic testing completed and sent off to [facility]

## 2025-04-16 NOTE — PATIENT INSTRUCTIONS
from 5-6pm at [facility]. If you would like to attend, please RSVP to [email or web address]    Thank you for allowing us the privilege of being a part of your healthcare team! Please do not hesitate to contact our office at [phone] option 2 with any questions or concerns.

## 2025-04-17 DIAGNOSIS — I50.32 CHRONIC DIASTOLIC HEART FAILURE (HCC): ICD-10-CM

## 2025-04-18 ENCOUNTER — TELEPHONE (OUTPATIENT)
Age: 85
End: 2025-04-18

## 2025-04-18 RX ORDER — TAFAMIDIS 61 MG/1
61 CAPSULE, LIQUID FILLED ORAL DAILY
Qty: 90 CAPSULE | Refills: 1 | Status: SHIPPED | OUTPATIENT
Start: 2025-04-18

## 2025-04-18 NOTE — TELEPHONE ENCOUNTER
Tried calling pt to Count includes the [facility] appt for 7 day holter at the [facility] or  office per Dr Cadet & Dr Aparicio.  I will also send pt a iXpert message.           Message  Received: Today  Macy Gautam LPN [place], Andrea M, RADHA; Charline Elder & Charline,    We are recommending a 7 day holter (live monitor).  We will adjust the order so that it is resulted under Dr. Cadet.  Will have a  call to make the appointment to have it placed in the Post office.  Dx: Palpitations to r/o afib (+ ATTR amyloid).    Macy CARRASQUILLO LPN          Previous Messages       ----- Message -----  From: Charline Elder  Sent: 4/18/2025   8:56 AM EDT  To: Charline Elder; Macy Gautam LPN    I was going to reach out to pt today to Count includes the [facility] but they want a loop not a holter.  If they want the loop then I will need to mail.  They also put the order in as ancillary so central scheduling will call.  That is the bad thing with some outside orders b/c they do that & pts get confused.    If they want to fix the order to extended holter & clinic performed I can use theirs since it will be in [facility].  If ordering Dr navarro in [facility] then I just need the order faxed to me.    Charline Irvin  ----- Message -----  From: Macy Gautam LPN  Sent: 4/17/2025   5:09 PM EDT  To: Charline Elder    How does it work with outside orders but us putting on in office?!  Or do I need to ask Dr. Aparicio to weigh in??    4/17/25 Dr. ALEXANDR Cadet \"TTR amyloid:  Positive PYP scan by visual score but not HCL ratio  Will get Cardiac MRI to confirm. Can get false positive results in the setting of CAD  She does have significant elevation in BNP and chronic pericardial effusion which can be associated with amyloid  Genetic testing today  Start Vyndamax 61 mg daily  Yearly holter/event monitoring to evaluate for A-fib, 7 day monitor ordered today  Supplements for management of TTR amyloid discussed with patient  - Green tea extract (epigallocatechin-3-gallate [EGCG]) may prevent fibril formation and can

## 2025-04-19 LAB
BUN SERPL-MCNC: 35 MG/DL (ref 8–27)
BUN/CREAT SERPL: 35 (ref 12–28)
CALCIUM SERPL-MCNC: 10.1 MG/DL (ref 8.7–10.3)
CHLORIDE SERPL-SCNC: 100 MMOL/L (ref 96–106)
CO2 SERPL-SCNC: 23 MMOL/L (ref 20–29)
CREAT SERPL-MCNC: 1 MG/DL (ref 0.57–1)
EGFRCR SERPLBLD CKD-EPI 2021: 55 ML/MIN/1.73
GLUCOSE SERPL-MCNC: 95 MG/DL (ref 70–99)
MISCELLANEOUS LAB TEST RESULT: NORMAL
NT-PROBNP SERPL-MCNC: ABNORMAL PG/ML (ref 0–738)
POTASSIUM SERPL-SCNC: 4.4 MMOL/L (ref 3.5–5.2)
PREALB SERPL-MCNC: 22 MG/DL (ref 9–32)
SODIUM SERPL-SCNC: 139 MMOL/L (ref 134–144)

## 2025-04-21 ENCOUNTER — ANCILLARY PROCEDURE (OUTPATIENT)
Age: 85
End: 2025-04-21
Payer: MEDICARE

## 2025-04-21 ENCOUNTER — RESULTS FOLLOW-UP (OUTPATIENT)
Age: 85
End: 2025-04-21

## 2025-04-21 DIAGNOSIS — E85.4 AMYLOID HEART DISEASE (HCC): ICD-10-CM

## 2025-04-21 DIAGNOSIS — R00.2 PALPITATION: ICD-10-CM

## 2025-04-21 DIAGNOSIS — I50.32 CHRONIC DIASTOLIC HEART FAILURE (HCC): ICD-10-CM

## 2025-04-21 DIAGNOSIS — I43 AMYLOID HEART DISEASE (HCC): ICD-10-CM

## 2025-04-21 PROCEDURE — 93244 EXT ECG>48HR<7D REV&INTERPJ: CPT | Performed by: SPECIALIST

## 2025-04-21 PROCEDURE — 93242 EXT ECG>48HR<7D RECORDING: CPT | Performed by: SPECIALIST

## 2025-04-21 RX ORDER — SPIRONOLACTONE 25 MG/1
12.5 TABLET ORAL 2 TIMES DAILY
COMMUNITY
End: 2025-05-14

## 2025-04-21 NOTE — TELEPHONE ENCOUNTER
----- Message from Dr. Johana Cadet MD sent at 4/21/2025  7:56 AM EDT -----  Please let patient know labs showed a significantly elevated proBNP. This can be elevated just in relation to having Amyloid and in heart failure. I would like to start her additionally on Spironolactone 25 mg daily. Renal function and electrolytes are normal    Spoke with patient using two patient identifiers. Reviewed above information per Dr. Cadet. Patient states understanding of results. She notes that she just started spironolactone about 3 weeks or so ago. She is currently taking half a 25mg tablet twice a day. Notified her that I will update med list and Dr. Cadet and will call back if any further changes are needed otherwise to continue current regimen.

## 2025-04-23 ENCOUNTER — TELEPHONE (OUTPATIENT)
Age: 85
End: 2025-04-23

## 2025-04-23 NOTE — TELEPHONE ENCOUNTER
Called [facility] special[facility] pharmacy to check on status Vyndmax  Co pay is unaffordable, patient will need co pay assistance.    Vyndamax co pay assistance [phone]    My chart message sent to patient regarding above.

## 2025-05-06 ENCOUNTER — RESULTS FOLLOW-UP (OUTPATIENT)
Age: 85
End: 2025-05-06

## 2025-05-06 NOTE — TELEPHONE ENCOUNTER
----- Message from Dr. Johana Cadet MD sent at 5/6/2025  8:46 AM EDT -----  Please let patient know her holter did not show any A-fib. She had some extra heart beats called PVCs and PACs, but there were not a significant number of these. We do not need to make any changes at this time.    Called patient regarding above results, Patient verbalized understanding. Patient has no further questions.

## 2025-05-13 ENCOUNTER — TELEPHONE (OUTPATIENT)
Age: 85
End: 2025-05-13

## 2025-05-13 NOTE — TELEPHONE ENCOUNTER
Called patients PCP and requested lab results and most recent office visit. Provided them with [facility] fax number. They state they will fax notes now.

## 2025-05-13 NOTE — TELEPHONE ENCOUNTER
Pt called at direction of PCP to move up 5/28/25 appt due to lab results.  She will be here tomorrow, 5/14/25 at 11:30 to see Dr. Cadet

## 2025-05-13 NOTE — PROGRESS NOTES
[facility]  [facility] in [place], [place]  Heart Failure Outpatient Clinic Note    Patient name: Shauna Cunningham  Patient : 1940  Patient MRN: [id number]  Date of service: 25    Primary care physician: Logan Sanchez MD  Primary cardiologist: Silvano Aapricio MD  Primary [facility] cardiologist: Katya Cadet MD      CHIEF COMPLAINT:  Follow up for TTR amyloid  Chief Complaint   Patient presents with    Congestive Heart Failure    Follow-up    Chest Pain     At random, minor    Shortness of Breath     Bad today but not every day    Edema     Feet and legs since . Stopped anibal when swelling         ASSESSMENT:  Shauna Cunningham is a 85 y.o. female with a history of HFpEF and recent PYP scan suggesting TTR amyloid.     PLAN:  Heart failure/Cardiomyopathy:  NYHA III  Continue current medical therapy for heart failure:  ACE/ARB/ARNi: Generally not well tolerated in amyloid, will hold for now  MRA: Started after last visit and patient discontinued when she developed peripheral edema. I do not suspect Spironolactone caused edema. Patient does not wish to restart  SGLT2 inhibitor: Start Jardiance 10 mg daily now, she did not start after last visit.   Diuretic: I asked her to use Bumex 0.5 mg for 3 days and then continue every other day  Reinforced low salt diet  Reinforced fluid restriction to 6 x 8oz glasses per day  Recommended 30 minutes of aerobic exercise 5 days weekly  Labs: recent labs with PCP reviewed. proBNP up to 15,000    wtTTR amyloid:  Positive PYP scan by visual score but not HCL ratio  Cardiac MRI scheduled 25 to confirm. Can get false positive results in the setting of CAD  She does have significant elevation in BNP and chronic pericardial effusion which can be associated with amyloid  Genetic testing negative  She has not yet started Vyndamax 61 mg, she reports it should be delivered shortly to her  Holter 2025 negative for A-fib/flutter  Supplements for

## 2025-05-14 ENCOUNTER — OFFICE VISIT (OUTPATIENT)
Age: 85
End: 2025-05-14
Payer: MEDICARE

## 2025-05-14 VITALS
WEIGHT: 78.6 LBS | RESPIRATION RATE: 20 BRPM | HEART RATE: 65 BPM | TEMPERATURE: 97.6 F | SYSTOLIC BLOOD PRESSURE: 190 MMHG | BODY MASS INDEX: 14.46 KG/M2 | OXYGEN SATURATION: 96 % | HEIGHT: 62 IN | DIASTOLIC BLOOD PRESSURE: 60 MMHG

## 2025-05-14 DIAGNOSIS — I10 BENIGN ESSENTIAL HYPERTENSION: ICD-10-CM

## 2025-05-14 DIAGNOSIS — I43 AMYLOID HEART DISEASE (HCC): Primary | ICD-10-CM

## 2025-05-14 DIAGNOSIS — E85.4 AMYLOID HEART DISEASE (HCC): Primary | ICD-10-CM

## 2025-05-14 DIAGNOSIS — I50.32 CHRONIC DIASTOLIC HEART FAILURE (HCC): ICD-10-CM

## 2025-05-14 PROCEDURE — 1123F ACP DISCUSS/DSCN MKR DOCD: CPT | Performed by: INTERNAL MEDICINE

## 2025-05-14 PROCEDURE — G8419 CALC BMI OUT NRM PARAM NOF/U: HCPCS | Performed by: INTERNAL MEDICINE

## 2025-05-14 PROCEDURE — 99214 OFFICE O/P EST MOD 30 MIN: CPT | Performed by: INTERNAL MEDICINE

## 2025-05-14 PROCEDURE — G8427 DOCREV CUR MEDS BY ELIG CLIN: HCPCS | Performed by: INTERNAL MEDICINE

## 2025-05-14 PROCEDURE — G8400 PT W/DXA NO RESULTS DOC: HCPCS | Performed by: INTERNAL MEDICINE

## 2025-05-14 PROCEDURE — 1126F AMNT PAIN NOTED NONE PRSNT: CPT | Performed by: INTERNAL MEDICINE

## 2025-05-14 PROCEDURE — 1036F TOBACCO NON-USER: CPT | Performed by: INTERNAL MEDICINE

## 2025-05-14 PROCEDURE — 1090F PRES/ABSN URINE INCON ASSESS: CPT | Performed by: INTERNAL MEDICINE

## 2025-05-14 PROCEDURE — 3077F SYST BP >= 140 MM HG: CPT | Performed by: INTERNAL MEDICINE

## 2025-05-14 PROCEDURE — 1159F MED LIST DOCD IN RCRD: CPT | Performed by: INTERNAL MEDICINE

## 2025-05-14 PROCEDURE — 3078F DIAST BP <80 MM HG: CPT | Performed by: INTERNAL MEDICINE

## 2025-05-14 RX ORDER — AMLODIPINE BESYLATE 2.5 MG/1
TABLET ORAL
Qty: 120 TABLET | Refills: 3 | Status: SHIPPED | OUTPATIENT
Start: 2025-05-14

## 2025-05-14 ASSESSMENT — PATIENT HEALTH QUESTIONNAIRE - PHQ9
2. FEELING DOWN, DEPRESSED OR HOPELESS: NOT AT ALL
1. LITTLE INTEREST OR PLEASURE IN DOING THINGS: NOT AT ALL
SUM OF ALL RESPONSES TO PHQ QUESTIONS 1-9: 0

## 2025-05-14 NOTE — PATIENT INSTRUCTIONS
Medication changes:    Start taking Jardiance 10 mg every day    Start taking Vyndamax 61 mg every day    Use Bumex 0.5 mg daily for 3 days and then take every other day    Increase Amlodipine to 2.5 mg in the morning, 2.5 mg in the afternoon, and 5 mg in the evening    Do not take Spironolactone      Please take this to your pharmacy to notify them of the change in medications.     Testing Ordered:    none      Referrals:      Other Recommendations:      - Record blood pressure and heart rate daily before medication and two hours after medication  - Record weight daily upon waking/after using the bathroom.   - Keep a written records of your weights and blood pressure and bring to your next appointment.   - Call the clinic at if you have a weight gain of 3 or more pounds overnight OR 5 or more pounds in one week, new/worsened shortness of breath or swelling, or if your blood pressure begins to consistently run below 90/60 and/or you begin to experience dizziness or lightheadedness. Our office phone number [phone] option 2.  - Ensure you are drinking an adequate amount of water with a goal of 6-8 eight ounce glasses (1.5-2 liters) of fluid daily. Your urine should be clear and light yellow straw colored.       Follow up 1 month with Dr. Cadet with [facility]    Our monthly Heart Failure Support Group is held on the last Wednesday of every month from 5-6pm at [facility]. If you would like to attend, please RSVP to [email or web address]    Thank you for allowing us the privilege of being a part of your healthcare team! Please do not hesitate to contact our office at [phone] option 2 with any questions or concerns.

## 2025-05-29 ENCOUNTER — HOSPITAL ENCOUNTER (INPATIENT)
Facility: HOSPITAL | Age: 85
LOS: 1 days | Discharge: HOME | DRG: 291 | End: 2025-05-30
Attending: EMERGENCY MEDICINE | Admitting: INTERNAL MEDICINE
Payer: MEDICARE

## 2025-05-29 ENCOUNTER — APPOINTMENT (OUTPATIENT)
Facility: HOSPITAL | Age: 85
DRG: 291 | End: 2025-05-29
Payer: MEDICARE

## 2025-05-29 DIAGNOSIS — J96.01 ACUTE HYPOXEMIC RESPIRATORY FAILURE (HCC): ICD-10-CM

## 2025-05-29 DIAGNOSIS — I50.9 ACUTE ON CHRONIC CONGESTIVE HEART FAILURE, UNSPECIFIED HEART FAILURE TYPE (HCC): Primary | ICD-10-CM

## 2025-05-29 DIAGNOSIS — I50.31 ACUTE DIASTOLIC CONGESTIVE HEART FAILURE (HCC): ICD-10-CM

## 2025-05-29 DIAGNOSIS — I16.1 HYPERTENSIVE EMERGENCY: ICD-10-CM

## 2025-05-29 LAB
ALBUMIN SERPL-MCNC: 3.5 G/DL (ref 3.5–5)
ALBUMIN/GLOB SERPL: 0.9 (ref 1.1–2.2)
ALP SERPL-CCNC: 97 U/L (ref 45–117)
ALT SERPL-CCNC: 17 U/L (ref 12–78)
ANION GAP SERPL CALC-SCNC: 9 MMOL/L (ref 2–12)
AST SERPL W P-5'-P-CCNC: 28 U/L (ref 15–37)
BASOPHILS # BLD: 0.03 K/UL (ref 0–0.1)
BASOPHILS NFR BLD: 0.3 % (ref 0–1)
BILIRUB SERPL-MCNC: 0.4 MG/DL (ref 0.2–1)
BNP SERPL-MCNC: ABNORMAL PG/ML
BUN SERPL-MCNC: 34 MG/DL (ref 6–20)
BUN/CREAT SERPL: 29 (ref 12–20)
CA-I BLD-MCNC: 9.2 MG/DL (ref 8.5–10.1)
CHLORIDE SERPL-SCNC: 103 MMOL/L (ref 97–108)
CO2 SERPL-SCNC: 25 MMOL/L (ref 21–32)
CREAT SERPL-MCNC: 1.16 MG/DL (ref 0.55–1.02)
DIFFERENTIAL METHOD BLD: ABNORMAL
EKG ATRIAL RATE: 82 BPM
EKG DIAGNOSIS: NORMAL
EKG P AXIS: 81 DEGREES
EKG P-R INTERVAL: 144 MS
EKG Q-T INTERVAL: 420 MS
EKG QRS DURATION: 78 MS
EKG QTC CALCULATION (BAZETT): 490 MS
EKG R AXIS: -23 DEGREES
EKG T AXIS: 120 DEGREES
EKG VENTRICULAR RATE: 82 BPM
EOSINOPHIL # BLD: 0.06 K/UL (ref 0–0.4)
EOSINOPHIL NFR BLD: 0.7 % (ref 0–7)
ERYTHROCYTE [DISTWIDTH] IN BLOOD BY AUTOMATED COUNT: 14.9 % (ref 11.5–14.5)
GLOBULIN SER CALC-MCNC: 3.9 G/DL (ref 2–4)
GLUCOSE SERPL-MCNC: 125 MG/DL (ref 65–100)
HCT VFR BLD AUTO: 35 % (ref 35–47)
HGB BLD-MCNC: 11.7 G/DL (ref 11.5–16)
IMM GRANULOCYTES # BLD AUTO: 0.06 K/UL (ref 0–0.04)
IMM GRANULOCYTES NFR BLD AUTO: 0.7 % (ref 0–0.5)
LYMPHOCYTES # BLD: 2.72 K/UL (ref 0.8–3.5)
LYMPHOCYTES NFR BLD: 29.6 % (ref 12–49)
MCH RBC QN AUTO: 27.7 PG (ref 26–34)
MCHC RBC AUTO-ENTMCNC: 33.4 G/DL (ref 30–36.5)
MCV RBC AUTO: 82.7 FL (ref 80–99)
MONOCYTES # BLD: 0.4 K/UL (ref 0–1)
MONOCYTES NFR BLD: 4.4 % (ref 5–13)
NEUTS SEG # BLD: 5.92 K/UL (ref 1.8–8)
NEUTS SEG NFR BLD: 64.3 % (ref 32–75)
NRBC # BLD: 0 K/UL (ref 0–0.01)
NRBC BLD-RTO: 0 PER 100 WBC
PLATELET # BLD AUTO: 327 K/UL (ref 150–400)
PMV BLD AUTO: 10.5 FL (ref 8.9–12.9)
POTASSIUM SERPL-SCNC: 2.9 MMOL/L (ref 3.5–5.1)
PROT SERPL-MCNC: 7.4 G/DL (ref 6.4–8.2)
RBC # BLD AUTO: 4.23 M/UL (ref 3.8–5.2)
SODIUM SERPL-SCNC: 137 MMOL/L (ref 136–145)
TROPONIN I SERPL HS-MCNC: 41 NG/L (ref 0–51)
TROPONIN I SERPL HS-MCNC: 48 NG/L (ref 0–51)
TSH SERPL DL<=0.05 MIU/L-ACNC: 0.5 UIU/ML (ref 0.36–3.74)
WBC # BLD AUTO: 9.2 K/UL (ref 3.6–11)

## 2025-05-29 PROCEDURE — 84484 ASSAY OF TROPONIN QUANT: CPT

## 2025-05-29 PROCEDURE — 6360000002 HC RX W HCPCS: Performed by: EMERGENCY MEDICINE

## 2025-05-29 PROCEDURE — 93005 ELECTROCARDIOGRAM TRACING: CPT | Performed by: EMERGENCY MEDICINE

## 2025-05-29 PROCEDURE — 2500000003 HC RX 250 WO HCPCS: Performed by: INTERNAL MEDICINE

## 2025-05-29 PROCEDURE — 80053 COMPREHEN METABOLIC PANEL: CPT

## 2025-05-29 PROCEDURE — 99285 EMERGENCY DEPT VISIT HI MDM: CPT

## 2025-05-29 PROCEDURE — 83540 ASSAY OF IRON: CPT

## 2025-05-29 PROCEDURE — 6370000000 HC RX 637 (ALT 250 FOR IP): Performed by: EMERGENCY MEDICINE

## 2025-05-29 PROCEDURE — 2060000000 HC ICU INTERMEDIATE R&B

## 2025-05-29 PROCEDURE — 84443 ASSAY THYROID STIM HORMONE: CPT

## 2025-05-29 PROCEDURE — 96374 THER/PROPH/DIAG INJ IV PUSH: CPT

## 2025-05-29 PROCEDURE — 84439 ASSAY OF FREE THYROXINE: CPT

## 2025-05-29 PROCEDURE — 6370000000 HC RX 637 (ALT 250 FOR IP): Performed by: INTERNAL MEDICINE

## 2025-05-29 PROCEDURE — 82728 ASSAY OF FERRITIN: CPT

## 2025-05-29 PROCEDURE — 36415 COLL VENOUS BLD VENIPUNCTURE: CPT

## 2025-05-29 PROCEDURE — 96375 TX/PRO/DX INJ NEW DRUG ADDON: CPT

## 2025-05-29 PROCEDURE — 85025 COMPLETE CBC W/AUTO DIFF WBC: CPT

## 2025-05-29 PROCEDURE — 83880 ASSAY OF NATRIURETIC PEPTIDE: CPT

## 2025-05-29 PROCEDURE — 71045 X-RAY EXAM CHEST 1 VIEW: CPT

## 2025-05-29 RX ORDER — ASPIRIN 81 MG/1
81 TABLET, CHEWABLE ORAL DAILY
Status: DISCONTINUED | OUTPATIENT
Start: 2025-05-29 | End: 2025-05-30 | Stop reason: HOSPADM

## 2025-05-29 RX ORDER — ISOSORBIDE DINITRATE 10 MG/1
10 TABLET ORAL 3 TIMES DAILY
Status: DISCONTINUED | OUTPATIENT
Start: 2025-05-29 | End: 2025-05-30 | Stop reason: HOSPADM

## 2025-05-29 RX ORDER — ONDANSETRON 4 MG/1
4 TABLET, ORALLY DISINTEGRATING ORAL EVERY 8 HOURS PRN
Status: DISCONTINUED | OUTPATIENT
Start: 2025-05-29 | End: 2025-05-30 | Stop reason: HOSPADM

## 2025-05-29 RX ORDER — AMLODIPINE BESYLATE 5 MG/1
2.5 TABLET ORAL
Status: DISCONTINUED | OUTPATIENT
Start: 2025-05-29 | End: 2025-05-30 | Stop reason: HOSPADM

## 2025-05-29 RX ORDER — SODIUM CHLORIDE 0.9 % (FLUSH) 0.9 %
5-40 SYRINGE (ML) INJECTION EVERY 12 HOURS SCHEDULED
Status: DISCONTINUED | OUTPATIENT
Start: 2025-05-29 | End: 2025-05-30 | Stop reason: HOSPADM

## 2025-05-29 RX ORDER — ATENOLOL 25 MG/1
25 TABLET ORAL
Status: COMPLETED | OUTPATIENT
Start: 2025-05-29 | End: 2025-05-29

## 2025-05-29 RX ORDER — LEVOTHYROXINE SODIUM 100 UG/1
100 TABLET ORAL DAILY
Status: DISCONTINUED | OUTPATIENT
Start: 2025-05-29 | End: 2025-05-30 | Stop reason: HOSPADM

## 2025-05-29 RX ORDER — HEPARIN SODIUM 5000 [USP'U]/ML
5000 INJECTION, SOLUTION INTRAVENOUS; SUBCUTANEOUS 2 TIMES DAILY
Status: DISCONTINUED | OUTPATIENT
Start: 2025-05-29 | End: 2025-05-30 | Stop reason: HOSPADM

## 2025-05-29 RX ORDER — AMLODIPINE BESYLATE 5 MG/1
5 TABLET ORAL
Status: COMPLETED | OUTPATIENT
Start: 2025-05-29 | End: 2025-05-29

## 2025-05-29 RX ORDER — VITAMIN B COMPLEX
100 TABLET ORAL DAILY
Status: DISCONTINUED | OUTPATIENT
Start: 2025-05-29 | End: 2025-05-29

## 2025-05-29 RX ORDER — ACETAMINOPHEN 325 MG/1
650 TABLET ORAL EVERY 6 HOURS PRN
Status: DISCONTINUED | OUTPATIENT
Start: 2025-05-29 | End: 2025-05-30 | Stop reason: HOSPADM

## 2025-05-29 RX ORDER — CHLORTHALIDONE 25 MG/1
25 TABLET ORAL DAILY
Status: DISCONTINUED | OUTPATIENT
Start: 2025-05-29 | End: 2025-05-30 | Stop reason: HOSPADM

## 2025-05-29 RX ORDER — POLYETHYLENE GLYCOL 3350 17 G/17G
17 POWDER, FOR SOLUTION ORAL DAILY PRN
Status: DISCONTINUED | OUTPATIENT
Start: 2025-05-29 | End: 2025-05-30 | Stop reason: HOSPADM

## 2025-05-29 RX ORDER — HYDRALAZINE HYDROCHLORIDE 20 MG/ML
10 INJECTION INTRAMUSCULAR; INTRAVENOUS
Status: DISCONTINUED | OUTPATIENT
Start: 2025-05-29 | End: 2025-05-29

## 2025-05-29 RX ORDER — SODIUM CHLORIDE 0.9 % (FLUSH) 0.9 %
5-40 SYRINGE (ML) INJECTION PRN
Status: DISCONTINUED | OUTPATIENT
Start: 2025-05-29 | End: 2025-05-30 | Stop reason: HOSPADM

## 2025-05-29 RX ORDER — VITAMIN B COMPLEX
1000 TABLET ORAL DAILY
Status: DISCONTINUED | OUTPATIENT
Start: 2025-05-29 | End: 2025-05-30 | Stop reason: HOSPADM

## 2025-05-29 RX ORDER — POTASSIUM CHLORIDE 750 MG/1
40 TABLET, EXTENDED RELEASE ORAL ONCE
Status: DISCONTINUED | OUTPATIENT
Start: 2025-05-29 | End: 2025-05-29

## 2025-05-29 RX ORDER — ACETAMINOPHEN 650 MG/1
650 SUPPOSITORY RECTAL EVERY 6 HOURS PRN
Status: DISCONTINUED | OUTPATIENT
Start: 2025-05-29 | End: 2025-05-30 | Stop reason: HOSPADM

## 2025-05-29 RX ORDER — SODIUM CHLORIDE 9 MG/ML
INJECTION, SOLUTION INTRAVENOUS PRN
Status: DISCONTINUED | OUTPATIENT
Start: 2025-05-29 | End: 2025-05-30 | Stop reason: HOSPADM

## 2025-05-29 RX ORDER — ONDANSETRON 2 MG/ML
4 INJECTION INTRAMUSCULAR; INTRAVENOUS EVERY 6 HOURS PRN
Status: DISCONTINUED | OUTPATIENT
Start: 2025-05-29 | End: 2025-05-30 | Stop reason: HOSPADM

## 2025-05-29 RX ORDER — AMLODIPINE BESYLATE 5 MG/1
2.5 TABLET ORAL EVERY MORNING
Status: DISCONTINUED | OUTPATIENT
Start: 2025-05-30 | End: 2025-05-30 | Stop reason: HOSPADM

## 2025-05-29 RX ORDER — ROSUVASTATIN CALCIUM 5 MG/1
10 TABLET, COATED ORAL NIGHTLY
Status: DISCONTINUED | OUTPATIENT
Start: 2025-05-29 | End: 2025-05-30 | Stop reason: HOSPADM

## 2025-05-29 RX ORDER — AMLODIPINE BESYLATE 5 MG/1
5 TABLET ORAL NIGHTLY
Status: DISCONTINUED | OUTPATIENT
Start: 2025-05-29 | End: 2025-05-30 | Stop reason: HOSPADM

## 2025-05-29 RX ORDER — BUMETANIDE 0.25 MG/ML
1 INJECTION, SOLUTION INTRAMUSCULAR; INTRAVENOUS
Status: COMPLETED | OUTPATIENT
Start: 2025-05-29 | End: 2025-05-29

## 2025-05-29 RX ORDER — ATENOLOL 25 MG/1
37.5 TABLET ORAL 2 TIMES DAILY
Status: DISCONTINUED | OUTPATIENT
Start: 2025-05-29 | End: 2025-05-30 | Stop reason: HOSPADM

## 2025-05-29 RX ORDER — CHLORTHALIDONE 25 MG/1
25 TABLET ORAL
Status: COMPLETED | OUTPATIENT
Start: 2025-05-29 | End: 2025-05-29

## 2025-05-29 RX ORDER — BUMETANIDE 0.25 MG/ML
1 INJECTION, SOLUTION INTRAMUSCULAR; INTRAVENOUS DAILY
Status: DISCONTINUED | OUTPATIENT
Start: 2025-05-29 | End: 2025-05-30

## 2025-05-29 RX ORDER — LABETALOL HYDROCHLORIDE 5 MG/ML
20 INJECTION, SOLUTION INTRAVENOUS ONCE
Status: COMPLETED | OUTPATIENT
Start: 2025-05-29 | End: 2025-05-29

## 2025-05-29 RX ADMIN — ATENOLOL 25 MG: 25 TABLET ORAL at 13:26

## 2025-05-29 RX ADMIN — AMLODIPINE BESYLATE 5 MG: 5 TABLET ORAL at 20:42

## 2025-05-29 RX ADMIN — ATENOLOL 37.5 MG: 25 TABLET ORAL at 20:38

## 2025-05-29 RX ADMIN — LABETALOL HYDROCHLORIDE 20 MG: 5 INJECTION, SOLUTION INTRAVENOUS at 10:20

## 2025-05-29 RX ADMIN — AMLODIPINE BESYLATE 5 MG: 5 TABLET ORAL at 13:24

## 2025-05-29 RX ADMIN — CHLORTHALIDONE 25 MG: 25 TABLET ORAL at 15:22

## 2025-05-29 RX ADMIN — NITROGLYCERIN 1 INCH: 20 OINTMENT TOPICAL at 15:21

## 2025-05-29 RX ADMIN — BUMETANIDE 1 MG: 0.25 INJECTION INTRAMUSCULAR; INTRAVENOUS at 10:16

## 2025-05-29 RX ADMIN — SODIUM CHLORIDE, PRESERVATIVE FREE 10 ML: 5 INJECTION INTRAVENOUS at 20:27

## 2025-05-29 ASSESSMENT — LIFESTYLE VARIABLES
HOW MANY STANDARD DRINKS CONTAINING ALCOHOL DO YOU HAVE ON A TYPICAL DAY: PATIENT DOES NOT DRINK
HOW OFTEN DO YOU HAVE A DRINK CONTAINING ALCOHOL: NEVER

## 2025-05-29 ASSESSMENT — PAIN - FUNCTIONAL ASSESSMENT: PAIN_FUNCTIONAL_ASSESSMENT: NONE - DENIES PAIN

## 2025-05-29 ASSESSMENT — PAIN SCALES - GENERAL
PAINLEVEL_OUTOF10: 0
PAINLEVEL_OUTOF10: 0

## 2025-05-29 NOTE — H&P
Hospitalist Admission Note    NAME:   Shauna Cunningham   : 1940   MRN: [id number]     Date/Time: 2025 2:43 PM    Patient PCP: Logan Sanchez MD    ______________________________________________________________________  Given the patient's current clinical presentation, I have a high level of concern for decompensation if discharged from the emergency department.  Complex decision making was performed, which includes reviewing the patient's available past medical records, laboratory results, and x-ray films.       My assessment of this patient's clinical condition and my plan of care is as follows.    Assessment / Plan:    Assessment:    Acute on Chronic Heart Failure; Preserved Ejection Fraction:  Patient is not on ACE/ARB/ARNi as her underlying pathology is suspected to be secondary to amyloid deposits and the above mentioned classes of medications are not well tolerated in such cases.  She had previously been on Mineralocorticoid Receptor Antagonistes; but reportedly developed peripheral edema on Spironolactone and has been discontinued with patient not wanting to restart.  She has been on Bumex 0.5 mg p.o. every other day.  Today's presentation may have been brought on by uncontrolled hypertension which she has been dealing with for a long time now.  Patient recently started on Jardiance.  Chronic Pericardial Effusion.  Believed to be related to Amyloidosis.  Hypertensive Urgency.  Blood pressure management has been difficult secondary to medication intolerance.  She has been on Amlodipine, but unfortunately cannot tolerate a full dose of 10 mg and has therefore been prescribed 2.5 mg p.o. in a.m. and afternoon with 5 mg p.o. at night.  She does not tolerate Hydralazine.  Atenolol is on her home medications and has recently been increased.  She is not on ACE/ARB/ARNi as noted above.  Hyperlipidemia.  Hypothyroidism.  Peripheral Arterial Disase.  Pulmonary Sarcoidosis.    Plan:    Admit.  Monitor  procedures/Xrays and details which were not copied into this note but were reviewed prior to creation of Plan.

## 2025-05-29 NOTE — PROGRESS NOTES
4 Eyes Skin Assessment     NAME:  Shauna Cunningham  YOB: 1940  MEDICAL RECORD NUMBER:  [id number]    The patient is being assessed for  Admission    I agree that at least one RN has performed a thorough Head to Toe Skin Assessment on the patient. ALL assessment sites listed below have been assessed.      Areas assessed by both nurses:    Head, Face, Ears, Shoulders, Back, Chest, Arms, Elbows, Hands, Sacrum. Buttock, Coccyx, Ischium, Legs. Feet and Heels, Under Medical Devices , and Other    no open areas skin intact          Does the Patient have a Wound? No noted wound(s)       Raghu Prevention initiated by RN: No  Wound Care Orders initiated by RN: No    Pressure Injury (Stage 3,4, Unstageable, DTI, NWPT, and Complex wounds) if present, place Wound referral order by RN under : No    New Ostomies, if present place, Ostomy referral order under : No     Nurse 1 eSignature: Electronically signed by Dorie Mooney RN on 5/29/25 at 6:03 PM EDT    **SHARE this note so that the co-signing nurse can place an eSignature**    Nurse 2 eSignature: Electronically signed by Can Sequeira RN on 5/29/25 at 6:10 PM EDT

## 2025-05-29 NOTE — ED NOTES
ED TO INPATIENT SBAR HANDOFF    Patient Name: Shauna Cunningham   Preferred Name: Ronna  : 1940  85 y.o.   Family/Caregiver Present: no   Code Status Order: No Order  PO Status: NPO:No  Telemetry Order: Yes  C-SSRS: Risk of Suicide: No Risk  Sitter no   Restraints:     Sepsis Risk Score Sepsis V2 Risk Score: 38.3    Situation  Chief Complaint   Patient presents with    Shortness of Breath     Brief Description of Patient's Condition: Pt to ED from home via [facility] with C/O SOB, starting at 0300.  Pt has recent diagnosis of CHF.  Per EMS, pt was 81% upon arrival with diminished bases, +1 pitting edema BLE, /100.  Per EMS, Pt placed on NRB and reached 90% SPO2.       Pt placed in hospital bed and now SPO2 93-95% on RA.  Mental Status: oriented and alert  Arrived from:Home  Imaging:   XR CHEST PORTABLE   Final Result   Increased mild cardiomegaly with mild bilateral pleural effusions and mild edema         Electronically signed by LUIGI DEY        Abnormal labs:   Abnormal Labs Reviewed   CBC WITH AUTO DIFFERENTIAL - Abnormal; Notable for the following components:       Result Value    RDW 14.9 (*)     Monocytes % 4.4 (*)     Immature Granulocytes % 0.7 (*)     Immature Granulocytes Absolute 0.06 (*)     All other components within normal limits   COMPREHENSIVE METABOLIC PANEL - Abnormal; Notable for the following components:    Potassium 2.9 (*)     Glucose 125 (*)     BUN 34 (*)     Creatinine 1.16 (*)     BUN/Creatinine Ratio 29 (*)     Est, Glom Filt Rate 46 (*)     Albumin/Globulin Ratio 0.9 (*)     All other components within normal limits   BRAIN NATRIURETIC PEPTIDE - Abnormal; Notable for the following components:    NT Pro-BNP 26,060 (*)     All other components within normal limits       Background  Allergies:   Allergies   Allergen Reactions    Coreg [Carvedilol] Swelling    Penicillins Diarrhea and Nausea And Vomiting    Sulfa Antibiotics Diarrhea and Nausea And Vomiting     History:  5/29/25 1522)   amLODIPine (NORVASC) tablet 5 mg (5 mg Oral Given 5/29/25 1324)     Last documented pain medication administration: n/a  Pertinent or High Risk Medications/Drips: no   If Yes, please provide details: n/a  Blood Product Administration: no  If Yes, please provide details: n/a  Process Protocols/Bundles: N/A    Recommendation  Incomplete STAT orders: see orders  Overdue Medications: see MAR, not verified  Patient Belongings:    Additional Comments: family at bedside.   If any further questions, please call Sending RN at [phone]      Admitting Unit Notification  Name of person notified and time: [address]      Electronically signed by: Electronically signed by Jami Colmenares RN on 5/29/2025 at 3:33 PM

## 2025-05-29 NOTE — PROGRESS NOTES
Received Order for Telemetry     Shauna Cunningham   1940   [id number]   Heart failure (HCC) [I50.9]  Acute diastolic congestive heart failure (HCC) [I50.31]  Hypertensive emergency [I16.1]  Acute hypoxemic respiratory failure (HCC) [J96.01]  Acute on chronic congestive heart failure, unspecified heart failure type (HCC) [I50.9]   Chema Bustillos MD     Tele Box # [phone] placed on patient at  1605 pm  ER Room # direct admit  Admitting to Room 482  Transferring Nurse prabhu  Verified with Primary Nurse prabhu at  1605 pm

## 2025-05-29 NOTE — CARE COORDINATION
05/29/25 1413   Service Assessment   Patient Orientation Alert and Oriented   Cognition Alert   History Provided By Patient   Primary Caregiver Self   Accompanied By/Relationship Son & daughter in law.   Support Systems Children;Family Members   Patient's Healthcare Decision Maker is: Legal Next of Kin   PCP Verified by CM Yes  (Logan Luna - seen 2 weeks ago.)   Last Visit to PCP Within last 3 months   Prior Functional Level Independent in ADLs/IADLs;Assistance with the following:;Mobility  (Cane/rollator PRN)   Current Functional Level Independent in ADLs/IADLs;Assistance with the following:;Mobility  (Cane/rollator PRN)   Can patient return to prior living arrangement Yes   Ability to make needs known: Fair   Family able to assist with home care needs: Yes   Would you like for me to discuss the discharge plan with any other family members/significant others, and if so, who? Yes  (Son Roshan Cunningham)   Financial Resources Medicare   Community Resources None   CM/SW Referral Other (see comment)  (None)   Social/Functional History   Lives With Alone   Type of Home House   Home Layout One level   Home Access Ramped entrance   Bathroom Shower/Tub Walk-in shower   Bathroom Toilet Standard   Bathroom Equipment None   Bathroom Accessibility Accessible   Home Equipment Cane   Receives Help From Family   Prior Level of Assist for ADLs Independent   Prior Level of Assist for Homemaking Independent   Homemaking Responsibilities Yes   Ambulation Assistance Needs assistance  (Cane/rollator PRN.)   Prior Level of Assist for Transfers Independent   Active  Yes   Occupation Retired   Discharge Planning   Type of Residence House   Living Arrangements Alone   Current Services Prior To Admission None   Potential Assistance Needed N/A   DME Ordered? No   Potential Assistance Purchasing Medications No   Type of Home Care Services None   Patient expects to be discharged to: House   One/Two Story Residence One story   History  of falls? 0   Services At/After Discharge   Transition of Care Consult (CM Consult) Discharge Planning   Services At/After Discharge None    Resource Information Provided? No   Mode of Transport at Discharge Other (see comment)  (Family)   Confirm Follow Up Transport Family     CM met with pt , son ( Roshan Cunningham), & his wife & D/C Plan is home alone & family to transport. Self care/uses cane/rollator PRN. [facility] Rxs [facility] upon discharge.     Advance Care Planning     General Advance Care Planning (ACP) Conversation    Date of Conversation: 5/29/2025  Conducted with: Patient with Decision Making Capacity and Legal next of kin  Other persons present: Son & his wife.     Healthcare Decision Maker:   Primary Decision Maker: Chandler Cunninghame - Child - [phone]    Secondary Decision Maker: Gabby Cunningham - Daughter-in-Law - [phone]       Content/Action Overview:    Reviewed DNR/DNI and patient         Length of Voluntary ACP Conversation in minutes:      Sulema Hennessy RN

## 2025-05-29 NOTE — CONSULTS
CARDIOLOGY CONSULTATION    REASON FOR CONSULT: HF    REQUESTING PROVIDER: BABAK Bustillos MD    CHIEF COMPLAINT:  SOB    HISTORY OF PRESENT ILLNESS:  Shauna Cunningham is a 85 y.o. year-old female with past medical history significant for HTN, HLD, Pulmonary Sarcoid and PVD s/p SMA stent x 2, upper GI bleed  who was evaluated today due to SOB  Pt f/u with AHF at [facility], Dr. Cadet. 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 She has HTN with significantly elevated BP at home and has not tolerated higher doses of Amlodipine or Atenolol in the past per her report to Dr. Cadet.   She presents today via EMS with c/o SOB, starting acutely since 0300. O2 sats were 81% per EMS report. 1+ pitting edema and /100.    Records from hospital admission course thus far reviewed.      Telemetry reviewed.      INPATIENT MEDICATIONS:  Home medications reviewed.    Current Facility-Administered Medications:     nitroglycerin (NITRO-BID) 2 % ointment 1 inch, 1 inch, Topical, NOW, Keven Tellez MD    atenolol (TENORMIN) tablet 25 mg, 25 mg, Oral, NOW, Keven Tellez MD    chlorthalidone (HYGROTON) tablet 25 mg, 25 mg, Oral, NOW, Keven Tellez MD    amLODIPine (NORVASC) tablet 5 mg, 5 mg, Oral, NOW, Keven Tellez MD    hydrALAZINE (APRESOLINE) injection 10 mg, 10 mg, IntraVENous, NOW, Keven Tellez MD    potassium chloride (KLOR-CON) extended release tablet 40 mEq, 40 mEq, Oral, Once, Keven Tellez MD    Current Outpatient Medications:     amLODIPine (NORVASC) 2.5 MG tablet, Take 1 tablet by mouth in the morning, 1 tablet at noon, and 2 tablets in the evening., Disp: 120 tablet, Rfl: 3    VYNDAMAX 61 MG CAPS, Take 61 mg by mouth daily, Disp: 90 capsule, Rfl: 1    empagliflozin (JARDIANCE) 10 MG tablet, Take 1 tablet by mouth daily, Disp: 90 tablet, Rfl: 1    bumetanide (BUMEX) 0.5 MG tablet, Take 1 tablet by mouth every 48 hours, Disp:  case with Dr. Lizarraga and our impression and recommendations are as follows:  HFpEF  - echo 2/13/25 EF 55/60%. Moderate pericardial effusion.  - PTA 0.5 mg Bumex every other day and chlorthalidone  - Will need IV Bumex with admission  - proBNP 89460+  - repeat echo ordered  2. TTR Amyloid    - f/u with [facility] team   - cMRI posted for 8/2025   - + PYP scan  3. Chronic pericardial effusion   - PTA diuretics  4. HTN   - Significantly elevated on prior OV with AHF team and at home. Also here in ED   - pt reported not tolerating higher doses of Amlodipine or Atenolol  - ACE/ARB/ARNi: Generally not well tolerated in amyloid   - Restart PTA meds  5. Hypokalemia   - replacement started  6. Hypothyroidism   - PTA Synthroid   - TSH level 1/10/25 81.4  7. HLD   - PTA Repatha and crestor   - cath 2019 with minimal CAD    Thank you for involving us in the care of this patient.  Please do not hesitate to call me or Dr. Lizarraga if additional questions arise.    Wisam Cuello PA-C  5/29/2025

## 2025-05-29 NOTE — ED PROVIDER NOTES
[facility] EMERGENCY DEPT  EMERGENCY DEPARTMENT HISTORY AND PHYSICAL EXAM      Date of evaluation: 5/29/2025  Patient Name: Shauna Cunningham  Birthdate 1940  MRN: [id number]  ED Provider: Keven Tellez MD   Note Started: 9:31 AM EDT 5/29/25    HISTORY OF PRESENT ILLNESS     Chief Complaint   Patient presents with    Shortness of Breath       History Provided By: Patient, EMS     HPI: Shauna Cunningham is a 85 y.o. female With history of HTN, HLD, hypothyroidism, PAD, heart failure presenting with shortness of breath.  Patient states this started yesterday but worse today.  On EMS arrival she was satting 81% on room air, blood pressure was 220/100.  She was given nitro,  placed on nonrebreather.    [facility]  Patient is on Bumex every other day.  Previous echo showed normal EF, mild LVH, moderate pericardial effusion    PAST MEDICAL HISTORY   Past Medical History:  Past Medical History:   Diagnosis Date    (HFpEF) heart failure with preserved ejection fraction (HCC)     Atherosclerosis     Hyperlipidemia     Hypertension     Hypothyroidism     Osteoporosis     PAD (peripheral artery disease)     Pericardial effusion     Pulmonary sarcoidosis        Past Surgical History:  Past Surgical History:   Procedure Laterality Date    EYE SURGERY      PARTIAL HYSTERECTOMY (CERVIX NOT REMOVED)      UPPER GASTROINTESTINAL ENDOSCOPY         Family History:  Family History   Problem Relation Age of Onset    Hypertension Mother     High Blood Pressure Mother     Cancer Father     Hypertension Sister     High Blood Pressure Sister        Social History:  Social History     Tobacco Use    Smoking status: Never    Smokeless tobacco: Never   Substance Use Topics    Alcohol use: Not Currently    Drug use: Not Currently       Allergies:  Allergies   Allergen Reactions    Coreg [Carvedilol] Swelling    Penicillins Diarrhea and Nausea And Vomiting    Sulfa Antibiotics Diarrhea and Nausea And Vomiting       PCP: Logan Sanchez  alert and oriented to person, place, and time. Mental status is at baseline.          SCREENINGS                No data recorded       LAB, EKG AND DIAGNOSTIC RESULTS   Labs:  Recent Results (from the past 12 hours)   EKG 12 Lead (Chest Pain)    Collection Time: 05/29/25  9:05 AM   Result Value Ref Range    Ventricular Rate 82 BPM    Atrial Rate 82 BPM    P-R Interval 144 ms    QRS Duration 78 ms    Q-T Interval 420 ms    QTc Calculation (Bazett) 490 ms    P Axis 81 degrees    R Axis -23 degrees    T Axis 120 degrees    Diagnosis       Sinus rhythm with sinus arrhythmia with occasional Premature ventricular   complexes  Possible Left atrial enlargement  Left ventricular hypertrophy with repolarization abnormality  Prolonged QT  Abnormal ECG  No previous ECGs available  Confirmed by CATRINA OBRIEN ([id number]) on 5/29/2025 11:05:55 AM     CBC with Auto Differential    Collection Time: 05/29/25  9:17 AM   Result Value Ref Range    WBC 9.2 3.6 - 11.0 K/uL    RBC 4.23 3.80 - 5.20 M/uL    Hemoglobin 11.7 11.5 - 16.0 g/dL    Hematocrit 35.0 35.0 - 47.0 %    MCV 82.7 80.0 - 99.0 FL    MCH 27.7 26.0 - 34.0 PG    MCHC 33.4 30.0 - 36.5 g/dL    RDW 14.9 (H) 11.5 - 14.5 %    Platelets 327 150 - 400 K/uL    MPV 10.5 8.9 - 12.9 FL    Nucleated RBCs 0.0 0.0  WBC    nRBC 0.00 0.00 - 0.01 K/uL    Neutrophils % 64.3 32.0 - 75.0 %    Lymphocytes % 29.6 12.0 - 49.0 %    Monocytes % 4.4 (L) 5.0 - 13.0 %    Eosinophils % 0.7 0.0 - 7.0 %    Basophils % 0.3 0.0 - 1.0 %    Immature Granulocytes % 0.7 (H) 0 - 0.5 %    Neutrophils Absolute 5.92 1.80 - 8.00 K/UL    Lymphocytes Absolute 2.72 0.80 - 3.50 K/UL    Monocytes Absolute 0.40 0.00 - 1.00 K/UL    Eosinophils Absolute 0.06 0.00 - 0.40 K/UL    Basophils Absolute 0.03 0.00 - 0.10 K/UL    Immature Granulocytes Absolute 0.06 (H) 0.00 - 0.04 K/UL    Differential Type AUTOMATED     Comprehensive Metabolic Panel    Collection Time: 05/29/25  9:17 AM   Result Value Ref Range    Sodium 137 136

## 2025-05-29 NOTE — NURSE NAVIGATOR
Heart Failure Education/Evaluation/Recommendations      Code Status:Full Code        CXRAY:        BNP:  DATE   RESULTS  05/29/2025  26,060          ECHO:        PYP: 03/06/2025  Interpretation Summary         PYP Study Findings: The study is suggestive of ATTR amyloidosis. Visual grade 2, HCL ratio 1.40.    Evaluation of AL amyloidosis by serum free light chain and urine and serum immunofixation is recommended in all patients undergoing Tc-99 PYP imaging (PYP imaging does not rule out AL amyloidosis). Results should be interpreted in the context of prior evaluation and referral to hematologist or amyloidosis expert is recommended if either a) Echo or CMR strongly suggestive but 99mTc- PYP is not suggestive or equivocal. b) serum free light chain are abnormal or equivocal.    Image quality is good.           Cardiology Consulted: YES- [facility] [place] ( Follows [facility] [place])          *IF HOME MEDREC is accurate.  Home GDMTS:         Medications            Dose           Frequency   Route   - Diuretics                 Bumex   0.5mg  Daily  PO   - Beta Blockers       NONE   - ACE/ARB/ARNI      NONE   - SGLT2                  Jardiance  10mg  Daily  PO    (NOT Taking??)   - MRA                        NONE    Amyloid Medication Vyndamax  61mg  Daily  PO    (Not Taking??)  ------------------------------------------------------------------------------------    RN-NN knocks, enters the room, and performs hand hygiene/ uses proper PPE. RNSafiaNN introduces herself and the reason for visit.    Education packet brought to the patients room. ([place], AHA heart failure book, AHA HF Zone calendar, AHA Take charge of Heart Failure Discussion Guide, Where's the sodium Nutrition Label, What is a Fluid Fluid Restriction, Daily weight knowledge, BP/WT Chart, Fluid/Sodium Restriction Chart)    Patient & Family agrees to Education.    Patient has a scale that works.    Patient & Family educated by RNTERRENCE, Doctor,

## 2025-05-29 NOTE — ED TRIAGE NOTES
Pt to ED from home via [facility] with C/O SOB, starting at 0300.  Pt has recent diagnosis of CHF.  Per EMS, pt was 81% upon arrival with diminished bases, +1 pitting edema BLE, /100.  Per EMS, Pt placed on NRB and reached 90% SPO2.      Pt placed in hospital bed and now SPO2 93-95% on RA.

## 2025-05-30 ENCOUNTER — APPOINTMENT (OUTPATIENT)
Facility: HOSPITAL | Age: 85
DRG: 291 | End: 2025-05-30
Payer: MEDICARE

## 2025-05-30 VITALS
TEMPERATURE: 97.5 F | HEART RATE: 63 BPM | SYSTOLIC BLOOD PRESSURE: 164 MMHG | BODY MASS INDEX: 15.94 KG/M2 | OXYGEN SATURATION: 93 % | RESPIRATION RATE: 18 BRPM | HEIGHT: 62 IN | DIASTOLIC BLOOD PRESSURE: 57 MMHG | WEIGHT: 86.64 LBS

## 2025-05-30 PROBLEM — E85.82 WILD-TYPE TRANSTHYRETIN-RELATED (ATTR) AMYLOIDOSIS (HCC): Chronic | Status: ACTIVE | Noted: 2025-05-30

## 2025-05-30 LAB
ALBUMIN SERPL-MCNC: 2.9 G/DL (ref 3.5–5)
ALBUMIN/GLOB SERPL: 1 (ref 1.1–2.2)
ALP SERPL-CCNC: 74 U/L (ref 45–117)
ALT SERPL-CCNC: 16 U/L (ref 12–78)
ANION GAP SERPL CALC-SCNC: 10 MMOL/L (ref 2–12)
AST SERPL W P-5'-P-CCNC: 22 U/L (ref 15–37)
BILIRUB DIRECT SERPL-MCNC: 0.1 MG/DL (ref 0–0.2)
BILIRUB SERPL-MCNC: 0.4 MG/DL (ref 0.2–1)
BUN SERPL-MCNC: 30 MG/DL (ref 6–20)
BUN/CREAT SERPL: 31 (ref 12–20)
CA-I BLD-MCNC: 8.5 MG/DL (ref 8.5–10.1)
CHLORIDE SERPL-SCNC: 101 MMOL/L (ref 97–108)
CHOLEST SERPL-MCNC: 169 MG/DL
CO2 SERPL-SCNC: 27 MMOL/L (ref 21–32)
CREAT SERPL-MCNC: 0.98 MG/DL (ref 0.55–1.02)
ECHO AO ROOT DIAM: 2.2 CM
ECHO AO ROOT INDEX: 1.64 CM/M2
ECHO AR MAX VEL PISA: 4.8 M/S
ECHO AV AREA PEAK VELOCITY: 0.8 CM2
ECHO AV AREA VTI: 0.9 CM2
ECHO AV AREA/BSA PEAK VELOCITY: 0.6 CM2/M2
ECHO AV AREA/BSA VTI: 0.7 CM2/M2
ECHO AV MEAN GRADIENT: 12 MMHG
ECHO AV MEAN VELOCITY: 1.6 M/S
ECHO AV PEAK GRADIENT: 21 MMHG
ECHO AV PEAK VELOCITY: 2.3 M/S
ECHO AV REGURGITANT PHT: 387 MS
ECHO AV VELOCITY RATIO: 0.3
ECHO AV VTI: 57.3 CM
ECHO BSA: 1.22 M2
ECHO EST RA PRESSURE: 8 MMHG
ECHO IVC EXP: 1.2 CM
ECHO LA AREA 2C: 16.3 CM2
ECHO LA AREA 4C: 15.2 CM2
ECHO LA DIAMETER INDEX: 2.16 CM/M2
ECHO LA DIAMETER: 2.9 CM
ECHO LA MAJOR AXIS: 4.6 CM
ECHO LA MINOR AXIS: 5.7 CM
ECHO LA TO AORTIC ROOT RATIO: 1.32
ECHO LA VOL BP: 41 ML (ref 22–52)
ECHO LA VOL MOD A2C: 38 ML (ref 22–52)
ECHO LA VOL MOD A4C: 38 ML (ref 22–52)
ECHO LA VOL/BSA BIPLANE: 31 ML/M2 (ref 16–34)
ECHO LA VOLUME INDEX MOD A2C: 28 ML/M2 (ref 16–34)
ECHO LA VOLUME INDEX MOD A4C: 28 ML/M2 (ref 16–34)
ECHO LV E' LATERAL VELOCITY: 4.88 CM/S
ECHO LV EDV A2C: 88 ML
ECHO LV EDV A4C: 70 ML
ECHO LV EDV INDEX A4C: 52 ML/M2
ECHO LV EDV NDEX A2C: 66 ML/M2
ECHO LV EF PHYSICIAN: 55 %
ECHO LV EJECTION FRACTION A2C: 60 %
ECHO LV EJECTION FRACTION A4C: 50 %
ECHO LV EJECTION FRACTION BIPLANE: 60 % (ref 55–100)
ECHO LV ESV A2C: 35 ML
ECHO LV ESV A4C: 35 ML
ECHO LV ESV INDEX A2C: 26 ML/M2
ECHO LV ESV INDEX A4C: 26 ML/M2
ECHO LV FRACTIONAL SHORTENING: 33 % (ref 28–44)
ECHO LV INTERNAL DIMENSION DIASTOLE INDEX: 3.13 CM/M2
ECHO LV INTERNAL DIMENSION DIASTOLIC: 4.2 CM (ref 3.9–5.3)
ECHO LV INTERNAL DIMENSION SYSTOLIC INDEX: 2.09 CM/M2
ECHO LV INTERNAL DIMENSION SYSTOLIC: 2.8 CM
ECHO LV IVSD: 1.2 CM (ref 0.6–0.9)
ECHO LV MASS 2D: 157.1 G (ref 67–162)
ECHO LV MASS INDEX 2D: 117.2 G/M2 (ref 43–95)
ECHO LV POSTERIOR WALL DIASTOLIC: 1 CM (ref 0.6–0.9)
ECHO LV RELATIVE WALL THICKNESS RATIO: 0.48
ECHO LVOT AREA: 2.5 CM2
ECHO LVOT AV VTI INDEX: 0.34
ECHO LVOT DIAM: 1.8 CM
ECHO LVOT MEAN GRADIENT: 1 MMHG
ECHO LVOT PEAK GRADIENT: 2 MMHG
ECHO LVOT PEAK VELOCITY: 0.7 M/S
ECHO LVOT STROKE VOLUME INDEX: 37.4 ML/M2
ECHO LVOT SV: 50.1 ML
ECHO LVOT VTI: 19.7 CM
ECHO MV A VELOCITY: 0.87 M/S
ECHO MV AREA VTI: 1.4 CM2
ECHO MV E DECELERATION TIME (DT): 104 MS
ECHO MV E VELOCITY: 0.88 M/S
ECHO MV E/A RATIO: 1.01
ECHO MV E/E' LATERAL: 18.03
ECHO MV LVOT VTI INDEX: 1.88
ECHO MV MAX VELOCITY: 1 M/S
ECHO MV MEAN GRADIENT: 2 MMHG
ECHO MV MEAN VELOCITY: 0.6 M/S
ECHO MV PEAK GRADIENT: 4 MMHG
ECHO MV REGURGITANT PEAK GRADIENT: 58 MMHG
ECHO MV REGURGITANT PEAK VELOCITY: 3.8 M/S
ECHO MV VTI: 37 CM
ECHO PULMONARY ARTERY END DIASTOLIC PRESSURE: 8 MMHG
ECHO PV MAX VELOCITY: 1 M/S
ECHO PV MEAN GRADIENT: 3 MMHG
ECHO PV MEAN VELOCITY: 0.8 M/S
ECHO PV PEAK GRADIENT: 4 MMHG
ECHO PV REGURGITANT MAX VELOCITY: 1.4 M/S
ECHO PV VTI: 26.3 CM
ECHO RA AREA 4C: 9.8 CM2
ECHO RA END SYSTOLIC VOLUME APICAL 4 CHAMBER INDEX BSA: 13 ML/M2
ECHO RA VOLUME: 17 ML
ECHO RV BASAL DIMENSION: 2 CM
ECHO RV TAPSE: 1.5 CM (ref 1.7–?)
FERRITIN SERPL-MCNC: 32 NG/ML (ref 8–252)
GLOBULIN SER CALC-MCNC: 3 G/DL (ref 2–4)
GLUCOSE SERPL-MCNC: 96 MG/DL (ref 65–100)
HDLC SERPL-MCNC: 92 MG/DL
HDLC SERPL: 1.8 (ref 0–5)
IRON SATN MFR SERPL: 11 % (ref 20–50)
IRON SERPL-MCNC: 43 UG/DL (ref 35–150)
LDLC SERPL CALC-MCNC: 66.4 MG/DL (ref 0–100)
LIPID PANEL: NORMAL
MAGNESIUM SERPL-MCNC: 2.3 MG/DL (ref 1.6–2.4)
POTASSIUM SERPL-SCNC: 2.8 MMOL/L (ref 3.5–5.1)
POTASSIUM SERPL-SCNC: 3.4 MMOL/L (ref 3.5–5.1)
POTASSIUM SERPL-SCNC: 3.8 MMOL/L (ref 3.5–5.1)
PROT SERPL-MCNC: 5.9 G/DL (ref 6.4–8.2)
SODIUM SERPL-SCNC: 138 MMOL/L (ref 136–145)
T4 FREE SERPL-MCNC: 1.8 NG/DL (ref 0.8–1.5)
TIBC SERPL-MCNC: 383 UG/DL (ref 250–450)
TRIGL SERPL-MCNC: 53 MG/DL
VLDLC SERPL CALC-MCNC: 10.6 MG/DL

## 2025-05-30 PROCEDURE — 83735 ASSAY OF MAGNESIUM: CPT

## 2025-05-30 PROCEDURE — 2580000003 HC RX 258: Performed by: INTERNAL MEDICINE

## 2025-05-30 PROCEDURE — 6360000004 HC RX CONTRAST MEDICATION

## 2025-05-30 PROCEDURE — 6370000000 HC RX 637 (ALT 250 FOR IP)

## 2025-05-30 PROCEDURE — 36415 COLL VENOUS BLD VENIPUNCTURE: CPT

## 2025-05-30 PROCEDURE — 80076 HEPATIC FUNCTION PANEL: CPT

## 2025-05-30 PROCEDURE — 80048 BASIC METABOLIC PNL TOTAL CA: CPT

## 2025-05-30 PROCEDURE — 2500000003 HC RX 250 WO HCPCS: Performed by: INTERNAL MEDICINE

## 2025-05-30 PROCEDURE — 6370000000 HC RX 637 (ALT 250 FOR IP): Performed by: INTERNAL MEDICINE

## 2025-05-30 PROCEDURE — C8929 TTE W OR WO FOL WCON,DOPPLER: HCPCS

## 2025-05-30 PROCEDURE — 6360000002 HC RX W HCPCS: Performed by: INTERNAL MEDICINE

## 2025-05-30 PROCEDURE — 84132 ASSAY OF SERUM POTASSIUM: CPT

## 2025-05-30 PROCEDURE — 80061 LIPID PANEL: CPT

## 2025-05-30 PROCEDURE — 6360000002 HC RX W HCPCS: Performed by: PHYSICIAN ASSISTANT

## 2025-05-30 RX ORDER — BUMETANIDE 0.5 MG/1
0.5 TABLET ORAL DAILY
Status: DISCONTINUED | OUTPATIENT
Start: 2025-05-31 | End: 2025-05-30 | Stop reason: HOSPADM

## 2025-05-30 RX ORDER — ISOSORBIDE DINITRATE 10 MG/1
10 TABLET ORAL 3 TIMES DAILY
Qty: 90 TABLET | Refills: 0 | Status: SHIPPED | OUTPATIENT
Start: 2025-05-30

## 2025-05-30 RX ORDER — POTASSIUM CHLORIDE 7.45 MG/ML
10 INJECTION INTRAVENOUS
Status: DISCONTINUED | OUTPATIENT
Start: 2025-05-30 | End: 2025-05-30

## 2025-05-30 RX ORDER — BUMETANIDE 0.5 MG/1
0.5 TABLET ORAL DAILY
Status: DISCONTINUED | OUTPATIENT
Start: 2025-05-30 | End: 2025-05-30

## 2025-05-30 RX ORDER — HYDRALAZINE HYDROCHLORIDE 20 MG/ML
10 INJECTION INTRAMUSCULAR; INTRAVENOUS EVERY 6 HOURS PRN
Status: DISCONTINUED | OUTPATIENT
Start: 2025-05-30 | End: 2025-05-30 | Stop reason: HOSPADM

## 2025-05-30 RX ORDER — BUMETANIDE 0.5 MG/1
0.5 TABLET ORAL DAILY
Qty: 30 TABLET | Refills: 0 | Status: SHIPPED | OUTPATIENT
Start: 2025-05-30

## 2025-05-30 RX ORDER — ATENOLOL 25 MG/1
37.5 TABLET ORAL 2 TIMES DAILY
Qty: 90 TABLET | Refills: 0 | Status: SHIPPED | OUTPATIENT
Start: 2025-05-30 | End: 2025-06-29

## 2025-05-30 RX ADMIN — ISOSORBIDE DINITRATE 10 MG: 10 TABLET ORAL at 13:52

## 2025-05-30 RX ADMIN — Medication 1000 UNITS: at 10:02

## 2025-05-30 RX ADMIN — POTASSIUM BICARBONATE 40 MEQ: 782 TABLET, EFFERVESCENT ORAL at 10:02

## 2025-05-30 RX ADMIN — BUMETANIDE 1 MG: 0.25 INJECTION INTRAMUSCULAR; INTRAVENOUS at 10:02

## 2025-05-30 RX ADMIN — CHLORTHALIDONE 25 MG: 25 TABLET ORAL at 10:02

## 2025-05-30 RX ADMIN — SULFUR HEXAFLUORIDE 5 ML: 60.7; .19; .19 INJECTION, POWDER, LYOPHILIZED, FOR SUSPENSION INTRAVENOUS; INTRAVESICAL at 09:37

## 2025-05-30 RX ADMIN — AMLODIPINE BESYLATE 2.5 MG: 5 TABLET ORAL at 10:02

## 2025-05-30 RX ADMIN — SODIUM CHLORIDE, PRESERVATIVE FREE 10 ML: 5 INJECTION INTRAVENOUS at 10:16

## 2025-05-30 RX ADMIN — ATENOLOL 37.5 MG: 25 TABLET ORAL at 10:02

## 2025-05-30 RX ADMIN — ASPIRIN 81 MG: 81 TABLET, CHEWABLE ORAL at 10:03

## 2025-05-30 RX ADMIN — POTASSIUM BICARBONATE 20 MEQ: 782 TABLET, EFFERVESCENT ORAL at 16:53

## 2025-05-30 RX ADMIN — AMLODIPINE BESYLATE 2.5 MG: 5 TABLET ORAL at 13:52

## 2025-05-30 RX ADMIN — POTASSIUM CHLORIDE 10 MEQ: 7.46 INJECTION, SOLUTION INTRAVENOUS at 07:25

## 2025-05-30 RX ADMIN — ISOSORBIDE DINITRATE 10 MG: 10 TABLET ORAL at 10:02

## 2025-05-30 RX ADMIN — POTASSIUM CHLORIDE 10 MEQ: 7.46 INJECTION, SOLUTION INTRAVENOUS at 06:22

## 2025-05-30 RX ADMIN — LEVOTHYROXINE SODIUM 100 MCG: 0.1 TABLET ORAL at 10:02

## 2025-05-30 RX ADMIN — SODIUM CHLORIDE: 0.9 INJECTION, SOLUTION INTRAVENOUS at 06:36

## 2025-05-30 ASSESSMENT — PAIN SCALES - GENERAL: PAINLEVEL_OUTOF10: 0

## 2025-05-30 NOTE — PROGRESS NOTES
Patient refusing to have hydralazine at this time and she is saying that she has a 'reaction' to it. Informed HAI Aguilera

## 2025-05-30 NOTE — PLAN OF CARE
Problem: Discharge Planning  Goal: Discharge to home or other facility with appropriate resources  5/30/2025 1022 by Kay Engle RN  Outcome: Progressing  5/30/2025 1022 by Kay Engle RN  Outcome: Progressing  5/29/2025 2028 by Michelle Vora RN  Outcome: Progressing     Problem: Skin/Tissue Integrity  Goal: Skin integrity remains intact  Description: 1.  Monitor for areas of redness and/or skin breakdown2.  Assess vascular access sites hourly3.  Every 4-6 hours minimum:  Change oxygen saturation probe site4.  Every 4-6 hours:  If on nasal continuous positive airway pressure, respiratory therapy assess nares and determine need for appliance change or resting period  5/30/2025 1022 by Kay Engle RN  Outcome: Progressing  5/29/2025 2028 by Michelle Vora RN  Outcome: Progressing     Problem: ABCDS Injury Assessment  Goal: Absence of physical injury  5/30/2025 1022 by Kay Engle RN  Outcome: Progressing  5/29/2025 2028 by Michelle Vora RN  Outcome: Progressing     Problem: Safety - Adult  Goal: Free from fall injury  5/30/2025 1022 by Kay Engle RN  Outcome: Progressing  5/29/2025 2028 by Michelle Vora RN  Outcome: Progressing     Problem: Pain  Goal: Verbalizes/displays adequate comfort level or baseline comfort level  5/30/2025 1022 by Kay Engle RN  Outcome: Progressing  5/29/2025 2028 by Michelle Vora RN  Outcome: Progressing

## 2025-05-30 NOTE — CARE COORDINATION
Transition of Care Plan:    RUR: 13%  Prior Level of Functioning: independent  Disposition: home  CAROL: 5/30/25  If SNF or IPR: Date FOC offered: n/a  Date FOC received: n/a  Accepting facility: n/a  Date authorization started with reference number: n/a  Date authorization received and expires: n/a  Follow up appointments: yes  DME needed: n/a  Transportation at discharge: patient arranged  IM/IMM Medicare/ letter given: previously given  Is patient a [employer] and connected with [facility]? N/a   If yes, was [employer] transfer form completed and VA notified?   Caregiver Contact: patient  Discharge Caregiver contacted prior to discharge? Patient aware  Care Conference needed? N/a  Barriers to discharge: n/a

## 2025-05-30 NOTE — PLAN OF CARE
Problem: Discharge Planning  Goal: Discharge to home or other facility with appropriate resources  5/29/2025 2028 by Michelle Vora RN  Outcome: Progressing  5/29/2025 1721 by Dorie Mooney RN  Outcome: Progressing     Problem: Skin/Tissue Integrity  Goal: Skin integrity remains intact  Description: 1.  Monitor for areas of redness and/or skin breakdown2.  Assess vascular access sites hourly3.  Every 4-6 hours minimum:  Change oxygen saturation probe site4.  Every 4-6 hours:  If on nasal continuous positive airway pressure, respiratory therapy assess nares and determine need for appliance change or resting period  5/29/2025 2028 by iMchelle Vora RN  Outcome: Progressing  5/29/2025 1721 by Dorie Mooney RN  Outcome: Progressing     Problem: ABCDS Injury Assessment  Goal: Absence of physical injury  5/29/2025 2028 by Michelle Vora RN  Outcome: Progressing  5/29/2025 1721 by Dorie Mooney RN  Outcome: Progressing     Problem: Safety - Adult  Goal: Free from fall injury  5/29/2025 2028 by Michelle Vora RN  Outcome: Progressing  5/29/2025 1721 by Dorie Mooney RN  Outcome: Progressing     Problem: Pain  Goal: Verbalizes/displays adequate comfort level or baseline comfort level  Outcome: Progressing      Sent in rx

## 2025-05-30 NOTE — PLAN OF CARE
Problem: Nutrition Deficit:  Goal: Optimize nutritional status  Outcome: Progressing  Flowsheets (Taken 5/30/2025 5482)  Nutrient intake appropriate for improving, restoring, or maintaining nutritional needs:   Assess nutritional status and recommend course of action   Monitor oral intake, labs, and treatment plans

## 2025-05-30 NOTE — DISCHARGE SUMMARY
Discharge Summary    Name: Shauna Cunningham  [id number]  YOB: 1940 (Age: 85 y.o.)   Date of Admission: 5/29/2025  Date of Discharge: 5/30/2025  Attending Physician: Blake Armstrong MD    Discharge Diagnosis:   Principal Problem:    Heart failure (HCC)  Resolved Problems:    * No resolved hospital problems. *  Acute on chronic HFpEF  Pulmonary edema  Acute hypoxic respiratory failure, resolved  Chronic pericardial effusion  Hypertensive urgency  Hyperlipidemia  Hypothyroidism  PAD  Pulmonary sarcoidosis    Consultations:  IP CONSULT TO CARDIOLOGY  IP CONSULT TO HEART FAILURE NURSE/COORDINATOR      Brief Admission History/Reason for Admission Per Chema Bustillos MD:   Dyspnea    Brief Hospital Course by Main Problems:   Shauna Cunningham is an 85-year-old female with PMHx of HFpEF, chronic pericardial effusion, amyloidosis, hypertension, hyperlipidemia, hypothyroidism, PAD, and pulmonary sarcoidosis who presented to ED for progressively worsening shortness of breath.  In ED, found to be 81% on room air with /110.  Given nitro and placed on nonrebreather with resolution of hypoxia and has been on room air since.  Per chart review, patient with uncontrolled hypertension at baseline (SBP 170s to 200s systolic at outpatient visits) due to intolerance to many blood pressure medications and cardiac amyloidosis.  Patient was recently started on Bumex 0.5 mg every other day.  Admission lab work significant for potassium 2.9, creatinine 1.16, and BNP 26,000.  EKG showed sinus arrhythmia with occasional PVCs, no ischemic changes.  Delta troponins negative.  CXR showed increased cardiomegaly with mild bilateral pleural effusions and edema.  Started on IV Bumex for diuresis.  Cardiology consulted.  Recommend transition back to p.o. Bumex, now daily instead of every other day.  Blood pressures are patient's baseline and no adjustments to medications are recommended at this time.   Sosy SC injection  Generic drug: denosumab     rosuvastatin 10 MG tablet  Commonly known as: CRESTOR     vitamin D 25 MCG (1000 UT) Caps     Vyndamax 61 MG Caps  Generic drug: Tafamidis  Take 61 mg by mouth daily               Where to Get Your Medications        These medications were sent to [facility] #[id number] - [place], [place] - [address] [facility] - P [phone] - F [phone]  [address] [facility] [address]      Phone: [phone]   atenolol 25 MG tablet  bumetanide 0.5 MG tablet  isosorbide dinitrate 10 MG tablet  potassium bicarbonate 25 MEQ disintegrating tablet             DISPOSITION:    Home with Family:    x   Home with HH/PT/OT/RN:    SNF/LTC:    LEWIS:    OTHER:            Code status:   Recommended diet: cardiac diet  Recommended activity: activity as tolerated  Wound care: None      Follow up with:   PCP : Logan Sanchez MD  Cadet, Johana XIAO MD  [address] [address] [phone]    Follow up on 6/10/2025  Follow-up with cardiologist (advanced heart failure)@1pm with Belinda José NP.     Please call the office for any new or worsening symptoms. Dizziness, light-headedness, weight gain, leg/foot/ankle swelling, Elevated Bps top number greater than 160 After 1.5hrs taking medication, increased shortness of breath, and or weakness.    Logan Sanchez MD  [address] [facility] [address] [phone]    Schedule an appointment as soon as possible for a visit in 1 week(s)  Follow-up with PCP in 10 to 14 days given recent hospitalization for continued monitoring and management of medications and other comorbidities.          Total time in minutes spent coordinating this discharge (includes going over instructions, follow-up, prescriptions, and preparing report for sign off to her PCP) :  35 minutes

## 2025-05-30 NOTE — CARE COORDINATION
Chart reviewed, DCP remains for patient to d/c from  to home with spouse once medically stable.    CM continues to follow and monitor for needs.

## 2025-05-30 NOTE — NURSE NAVIGATOR
RN-NN update note    ECHO: 05/29/2025  Interpretation Summary    Left Ventricle: Normal left ventricular systolic function with a visually estimated EF of 55 - 60%. Left ventricle is dilated. Increased wall thickness. EF BP is 60%.    Right Ventricle: Right ventricle size is normal. Reduced systolic function. TAPSE is 1.5 cm.    Aortic Valve: Moderate regurgitation. Stenosis of the aortic valve.    Mitral Valve: Mild to moderate regurgitation with multiple jets (2).    Pericardium: Moderate (1-2 cm) pericardial effusion present. No indication of cardiac tamponade.    Extracardiac: Right pleural effusion.    Image quality is adequate. Contrast used: Lumason.        Patient's BP is more elevated in AM prior to meds after meds drops then re-elevates. Patient voices concerns of reading side-effects online and medication interactions and costs prior to taking medications    RNTERRENCE had a long discussion with the patient about medications, side-effects, and costs.    Patient is agreeable to try Jardiance and Vyndamax. However, she wants to wait until she returns home.     Pt wants to go home.    Per [facility]- Patient is Cleared for Discharge. Follow-up with Heart Failure Team needed.    Bumex 0.5mg should be daily until seen by Dr. Cadet/ Heart Failure Team      Patient refuses to take NEW medications due she has so many side-effects and fear of crashing and history of having side-effects in the past. 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    RN -NN called the Heart Failure Team and Moved the patient's appt to 06/10 from 06/25. Patient will need close monitoring from the heart failure team due to Hypertension and Volume overload.    Patient would benefit the most from starting her Vyndamax and Jardiance to assist with the long term medications.

## 2025-05-30 NOTE — PROGRESS NOTES
CARDIOLOGY PROGRESS NOTE    HISTORY OF PRESENT ILLNESS:  Shauna Cunningham is a 85 y.o. year-old female with past medical history significant for HTN, HLD, Pulmonary Sarcoid and PVD s/p SMA stent x 2, upper GI bleed  who was evaluated today due to SOB  Pt f/u with AHF at [facility], Dr. Cadet. 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 She has HTN with significantly elevated BP at home and has not tolerated higher doses of Amlodipine or Atenolol in the past per her report to Dr. Cadet.   She presents today via EMS with c/o SOB, starting acutely since 0300. O2 sats were 81% per EMS report. 1+ pitting edema and /100.    Received sitting up in bed. Feeling SOB back to baseline. No edema of legs, per patient.    Records from hospital admission course thus far reviewed.      Telemetry reviewed.  SR 71    INPATIENT MEDICATIONS:  Home medications reviewed.    Current Facility-Administered Medications:     hydrALAZINE (APRESOLINE) injection 10 mg, 10 mg, IntraVENous, Q6H PRN, Shalonda Ralph PA-C    atenolol (TENORMIN) tablet 37.5 mg, 37.5 mg, Oral, BID, Chema Bustillos MD, 37.5 mg at 05/30/25 1002    amLODIPine (NORVASC) tablet 2.5 mg, 2.5 mg, Oral, QAM, Chema Bustillos MD, 2.5 mg at 05/30/25 1002    amLODIPine (NORVASC) tablet 5 mg, 5 mg, Oral, Nightly, Chema Bustillos MD, 5 mg at 05/29/25 2042    amLODIPine (NORVASC) tablet 2.5 mg, 2.5 mg, Oral, Lunch, Chema Bustillos MD    isosorbide dinitrate (ISORDIL) tablet 10 mg, 10 mg, Oral, TID, Chema Bustillos MD, 10 mg at 05/30/25 1002    aspirin chewable tablet 81 mg, 81 mg, Oral, Daily, Chema Bustillos MD, 81 mg at 05/30/25 1003    empagliflozin (JARDIANCE) tablet 10 mg, 10 mg, Oral, Daily, Chema Bustillos MD    levothyroxine (SYNTHROID) tablet 100 mcg, 100 mcg, Oral, Daily, Chema Bustillos MD, 100 mcg at 05/30/25 1002    rosuvastatin (CRESTOR) tablet 10 mg, 10 mg, Oral, Nightly, Chema Bustillos MD    Vitamin D

## 2025-05-30 NOTE — PROGRESS NOTES
Comprehensive Nutrition Assessment    Type and Reason for Visit:  Initial, Positive nutrition screen    Nutrition Recommendations/Plan:   Continue Current Diet  Encourage PO intakes >50%  Monitor/document wt, BM, PO+ONS% in I/O  Add ONS to lunch and dinner     Malnutrition Assessment:  Malnutrition Status:  Insufficient data (05/30/25 1430)    Context:  Acute Illness     Findings of the 6 clinical characteristics of malnutrition:      Nutrition Assessment:    Blun screened for low BMI, admitted with Heart Failure. Receives a EBONY diet, no documented po intake. Per EMR review, current weight is up over past ~90 days. Will add ONS to diet order. Labs and meds reviewed, meds include Vit D.    Nutrition Related Findings:    NFPE deferred. Wound Type: None       Current Nutrition Intake & Therapies:    Average Meal Intake: Unable to assess  Average Supplements Intake: None Ordered  ADULT DIET; Regular; No Added Salt (3-4 gm)    Anthropometric Measures:  Height: 157.5 cm (5' 2.01\")  Ideal Body Weight (IBW): 110 lbs (50 kg)    Admission Body Weight: 39.3 kg (86 lb 10.3 oz)  Current Body Weight: 39.3 kg (86 lb 10.3 oz), 78.8 % IBW. Weight Source: Bed scale  Current BMI (kg/m2): 15.8                             BMI Categories: Underweight (BMI less than 22) age over 65    Estimated Daily Nutrient Needs:  Energy Requirements Based On: Kcal/kg  Weight Used for Energy Requirements: Current  Energy (kcal/day): 3465-2696 kcals (35 kcals/kg)  Weight Used for Protein Requirements: Current  Protein (g/day): 47 gr (1.2 gr/kg)  Method Used for Fluid Requirements: 1 ml/kcal  Fluid (ml/day): 1300 ml (1ml/kcal)    Nutrition Diagnosis:   Underweight related to inadequate protein-energy intake as evidenced by BMI    Nutrition Interventions:   Food and/or Nutrient Delivery: Continue Current Diet, Start Oral Nutrition Supplement  Nutrition Education/Counseling: Education/Counseling not indicated  Coordination of Nutrition Care: Continue to  monitor while inpatient       Goals:  Goals: PO intake 50% or greater, by next RD assessment  Type of Goal: New goal       Nutrition Monitoring and Evaluation:   Behavioral-Environmental Outcomes: None Identified  Food/Nutrient Intake Outcomes: Food and Nutrient Intake  Physical Signs/Symptoms Outcomes: Biochemical Data, Meal Time Behavior    Discharge Planning:    Continue current diet     Gabby Carrillo RD  Contact: alfredito

## 2025-05-30 NOTE — PROGRESS NOTES
As per Shalonda Ralph PA-C patient may discharge after dose of effer k    Discharge plan of care/case management plan validated with provider discharge order.

## 2025-06-01 NOTE — PROGRESS NOTES
Physician Progress Note      PATIENT:               ROBY SPENCER  CSN #:                  [id number]  :                       1940  ADMIT DATE:       2025 9:01 AM  DISCH DATE:        2025 5:22 PM  RESPONDING  PROVIDER #:        Blake JACK Cha, MD          QUERY TEXT:    Based on your medical judgment, please clarify these findings and document if   any of the following are being evaluated and/or treated:    The clinical indicators include:  Patient presented with SOB, satting 81% on room air, blood pressure was   220/100> placed on nonrebreather and titrated down to 2L NC  Admitted for acute on chronic HFpEF  Clinical indicators include:  sats 81% requiring nonrebreather for EMS transport and RR up to 26  Weaned to RA on   CXR- Increased mild cardiomegaly with mild bilateral pleural effusions and   mild edema   ED note \"Acute hypoxemic respiratory failure\"  Options provided:  -- Acute respiratory failure with hypoxia  -- Acute respiratory failure with hypercapnia  -- Acute respiratory distress without respiratory failure  -- Other - I will add my own diagnosis  -- Disagree - Not applicable / Not valid  -- Disagree - Clinically unable to determine / Unknown  -- Refer to Clinical Documentation Reviewer    PROVIDER RESPONSE TEXT:    This patient is in acute respiratory failure with hypoxia.    Query created by: Judith Oscar on 2025 11:40 AM      Electronically signed by:  Blake JACK Cha, MD 2025 11:05 AM

## 2025-06-02 ENCOUNTER — TELEPHONE (OUTPATIENT)
Age: 85
End: 2025-06-02

## 2025-06-02 NOTE — TELEPHONE ENCOUNTER
Per Colette, called pt to see if she could attend hospital discharge Wed, 6/4/25 at 1:00 with Tanesha in lieu of the 6/10/25 appt.  She could, and appt has been changed.  On the off chance her son can't bring her, she'll call us back.  We covered all appt details at time of reschedule.

## 2025-06-03 ENCOUNTER — FOLLOWUP TELEPHONE ENCOUNTER (OUTPATIENT)
Facility: HOSPITAL | Age: 85
End: 2025-06-03

## 2025-06-03 NOTE — TELEPHONE ENCOUNTER
RN spoke to the Heart Failure Clinic on 06/02 to see if the patient's appt could be moved up to this week from 06/10 if any cancellations.    Pts appt was moved up per patient to 06/04. Pt denies wt gain >1lb    Date  WT(lbs)  06/01  73.1  06/02  73.3  06/03  73.6    Pt is a very tiny lady with a low BMI.     Pt to discuss Vyndamax pill, Ankle edema, and HTN with Providers tomorrow at follow-up appt.    RN-NN does note that the patient would benefit from a nutritionist and advance care planning outpatient.

## 2025-06-03 NOTE — PROGRESS NOTES
ADVANCED HEART FAILURE CENTER  [facility] in [place], [place]  Heart Failure Outpatient Clinic Note    Patient name: Shauna Cunningham  Patient : 1940  Patient MRN: [id number]  Date of service: 25    Primary care physician: Logan Sanchez MD  Primary cardiologist: Silvano Aparicio MD  Primary [facility] cardiologist: Katya Cadet MD      CHIEF COMPLAINT:  Chief Complaint   Patient presents with    Follow-Up from Hospital     Presents with SOB and ankle swelling       ASSESSMENT:  Shauna Cunningham is a 85 y.o. female with a history of HFpEF and recent PYP scan suggesting TTR amyloid.     Current visit  25   Shauna Cunningham presents for hospital discharge accompanied by her son. She states she feels ok since discharge, she is still having some SOB but her ankles and feet are more swollen since she has been at home. Her BP remains elevated at home 150-160s. She does not eat much and is having trouble swallowing her Vyndamax.   VS reviewed- Wt trending up on home logs, BP   Labs  reviewed- creatinine stable   TTE  reviewed- EF remains 55-60%, mod AI, mod pericardial effusion- unchanged since     PLAN:  Heart failure/Cardiomyopathy:  NYHA III  Continue current medical therapy for heart failure:  ACE/ARB/ARNi: Generally not well tolerated in amyloid, will hold for now  MRA: Started after last visit and patient discontinued when she developed peripheral edema. Do not suspect Spironolactone caused edema. Patient does not wish to restart  SGLT2 inhibitor: Jardiance 10 mg daily   Diuretic:  Bumex 0.5mg daily, will increase to BID x 1 week for increased leg swelling   Reinforced low salt diet  Reinforced fluid restriction to 6 x 8oz glasses per day  Recommended 30 minutes of aerobic exercise 5 days weekly  Labs: HF labs at next visit     wtTTR amyloid:  Positive PYP scan by visual score but not HCL ratio  Cardiac MRI scheduled 25 to confirm. Can get false positive results in the setting of

## 2025-06-04 ENCOUNTER — OFFICE VISIT (OUTPATIENT)
Age: 85
End: 2025-06-04

## 2025-06-04 VITALS
HEIGHT: 62 IN | BODY MASS INDEX: 13.98 KG/M2 | SYSTOLIC BLOOD PRESSURE: 184 MMHG | RESPIRATION RATE: 18 BRPM | HEART RATE: 64 BPM | DIASTOLIC BLOOD PRESSURE: 60 MMHG | WEIGHT: 76 LBS | OXYGEN SATURATION: 97 %

## 2025-06-04 DIAGNOSIS — E85.82 WILD-TYPE TRANSTHYRETIN-RELATED (ATTR) AMYLOIDOSIS (HCC): ICD-10-CM

## 2025-06-04 DIAGNOSIS — I10 BENIGN ESSENTIAL HYPERTENSION: ICD-10-CM

## 2025-06-04 DIAGNOSIS — I43 AMYLOID HEART DISEASE (HCC): Primary | ICD-10-CM

## 2025-06-04 DIAGNOSIS — E85.4 AMYLOID HEART DISEASE (HCC): Primary | ICD-10-CM

## 2025-06-04 DIAGNOSIS — I50.32 CHRONIC DIASTOLIC HEART FAILURE (HCC): ICD-10-CM

## 2025-06-04 RX ORDER — CLONIDINE 0.1 MG/24H
1 PATCH, EXTENDED RELEASE TRANSDERMAL
Qty: 4 PATCH | Refills: 1 | Status: SHIPPED | OUTPATIENT
Start: 2025-06-04

## 2025-06-04 ASSESSMENT — ENCOUNTER SYMPTOMS
VOMITING: 0
ABDOMINAL DISTENTION: 0
SHORTNESS OF BREATH: 0
COUGH: 0
TROUBLE SWALLOWING: 1
NAUSEA: 0

## 2025-06-04 NOTE — PATIENT INSTRUCTIONS
Medication changes:    Increase bumex 1 tablet twice a day for 1 week  Start 0.1 clonidine patch every 7 days    Testing Ordered:    Cardiac MRI in August    Other Recommendations:      - Record blood pressure and heart rate daily before medication and two hours after medication  - Record weight daily upon waking/after using the bathroom.   - Keep a written records of your weights and blood pressure and bring to your next appointment.   - Call the clinic at if you have a weight gain of 3 or more pounds overnight OR 5 or more pounds in one week, new/worsened shortness of breath or swelling, or if your blood pressure begins to consistently run below 90/60 and/or you begin to experience dizziness or lightheadedness. Our office phone number [phone] option 2.  - Ensure you are drinking an adequate amount of water with a goal of 6-8 eight ounce glasses (1.5-2 liters) of fluid daily. Your urine should be clear and light yellow straw colored.       Follow up 4 weeks with [facility]    Our monthly Heart Failure Support Group is held on the last Wednesday of every month from 5-6pm at [facility]. If you would like to attend, please RSVP to [email or web address]    Thank you for allowing us the privilege of being a part of your healthcare team! Please do not hesitate to contact our office at [phone] option 2 with any questions or concerns.

## 2025-06-04 NOTE — PROGRESS NOTES
Chief Complaint   Patient presents with    Follow-Up from Hospital     Presents with SOB and ankle swelling     /65   Pulse 64   Resp 18   Ht 1.575 m (5' 2.01\")   Wt 34.5 kg (76 lb)   SpO2 97%   BMI 13.90 kg/m²   Have you been to the ER, urgent care clinic since your last visit?  Hospitalized since your last visit?   NO    Have you seen or consulted any other health care providers outside our system since your last visit?   NO

## 2025-06-27 DIAGNOSIS — I10 BENIGN ESSENTIAL HYPERTENSION: ICD-10-CM

## 2025-06-27 RX ORDER — CLONIDINE 0.1 MG/24H
1 PATCH, EXTENDED RELEASE TRANSDERMAL
Qty: 4 PATCH | Refills: 0 | Status: SHIPPED | OUTPATIENT
Start: 2025-06-27

## 2025-06-27 NOTE — TELEPHONE ENCOUNTER
Requested Prescriptions     Pending Prescriptions Disp Refills    cloNIDine (CATAPRES) 0.1 MG/24HR PTWK [Pharmacy Med Name: CLONIDINE 0.1 MG/DAY PATCH] 4 patch 0     Sig: PLACE 1 PATCH ONTO THE SKIN EVERY 7 DAYS.      Last appt 6/4  Next appt 7/2

## 2025-07-01 ASSESSMENT — ENCOUNTER SYMPTOMS
SHORTNESS OF BREATH: 0
ABDOMINAL DISTENTION: 0
COUGH: 0
TROUBLE SWALLOWING: 1
VOMITING: 0
NAUSEA: 0

## 2025-07-02 ENCOUNTER — OFFICE VISIT (OUTPATIENT)
Age: 85
End: 2025-07-02
Payer: MEDICARE

## 2025-07-02 VITALS
HEART RATE: 66 BPM | TEMPERATURE: 97.8 F | DIASTOLIC BLOOD PRESSURE: 68 MMHG | SYSTOLIC BLOOD PRESSURE: 220 MMHG | BODY MASS INDEX: 13.62 KG/M2 | OXYGEN SATURATION: 97 % | RESPIRATION RATE: 14 BRPM | HEIGHT: 62 IN | WEIGHT: 74 LBS

## 2025-07-02 DIAGNOSIS — I31.39 PERICARDIAL EFFUSION: ICD-10-CM

## 2025-07-02 DIAGNOSIS — I43 AMYLOID HEART DISEASE (HCC): ICD-10-CM

## 2025-07-02 DIAGNOSIS — I50.32 CHRONIC DIASTOLIC HEART FAILURE (HCC): Primary | ICD-10-CM

## 2025-07-02 DIAGNOSIS — E85.4 AMYLOID HEART DISEASE (HCC): ICD-10-CM

## 2025-07-02 PROCEDURE — 3077F SYST BP >= 140 MM HG: CPT | Performed by: NURSE PRACTITIONER

## 2025-07-02 PROCEDURE — 99214 OFFICE O/P EST MOD 30 MIN: CPT | Performed by: NURSE PRACTITIONER

## 2025-07-02 PROCEDURE — G8427 DOCREV CUR MEDS BY ELIG CLIN: HCPCS | Performed by: NURSE PRACTITIONER

## 2025-07-02 PROCEDURE — 1036F TOBACCO NON-USER: CPT | Performed by: NURSE PRACTITIONER

## 2025-07-02 PROCEDURE — 3078F DIAST BP <80 MM HG: CPT | Performed by: NURSE PRACTITIONER

## 2025-07-02 PROCEDURE — 1090F PRES/ABSN URINE INCON ASSESS: CPT | Performed by: NURSE PRACTITIONER

## 2025-07-02 PROCEDURE — 1123F ACP DISCUSS/DSCN MKR DOCD: CPT | Performed by: NURSE PRACTITIONER

## 2025-07-02 PROCEDURE — G8400 PT W/DXA NO RESULTS DOC: HCPCS | Performed by: NURSE PRACTITIONER

## 2025-07-02 PROCEDURE — 1159F MED LIST DOCD IN RCRD: CPT | Performed by: NURSE PRACTITIONER

## 2025-07-02 PROCEDURE — G8419 CALC BMI OUT NRM PARAM NOF/U: HCPCS | Performed by: NURSE PRACTITIONER

## 2025-07-02 PROCEDURE — 1126F AMNT PAIN NOTED NONE PRSNT: CPT | Performed by: NURSE PRACTITIONER

## 2025-07-02 ASSESSMENT — PATIENT HEALTH QUESTIONNAIRE - PHQ9
1. LITTLE INTEREST OR PLEASURE IN DOING THINGS: NOT AT ALL
SUM OF ALL RESPONSES TO PHQ QUESTIONS 1-9: 0
SUM OF ALL RESPONSES TO PHQ QUESTIONS 1-9: 0
2. FEELING DOWN, DEPRESSED OR HOPELESS: NOT AT ALL
SUM OF ALL RESPONSES TO PHQ QUESTIONS 1-9: 0
SUM OF ALL RESPONSES TO PHQ QUESTIONS 1-9: 0

## 2025-07-02 NOTE — PATIENT INSTRUCTIONS
Medication changes:    Increase clonidine 0.2mg as prescribed by nephrology     Please take this to your pharmacy to notify them of the change in medications.     Testing Ordered:        Referrals:      Other Recommendations:      - Record blood pressure and heart rate daily before medication and two hours after medication  - Record weight daily upon waking/after using the bathroom.   - Keep a written records of your weights and blood pressure and bring to your next appointment.   - Call the clinic at if you have a weight gain of 3 or more pounds overnight OR 5 or more pounds in one week, new/worsened shortness of breath or swelling, or if your blood pressure begins to consistently run below 90/60 and/or you begin to experience dizziness or lightheadedness. Our office phone number [phone] option 2.  - Ensure you are drinking an adequate amount of water with a goal of 6-8 eight ounce glasses (1.5-2 liters) of fluid daily. Your urine should be clear and light yellow straw colored.       Follow up 5 to 6 weeks  with [facility]    Our monthly Heart Failure Support Group is held on the last Wednesday of every month from 5-6pm at [facility]. If you would like to attend, please RSVP to [email or web address]    Thank you for allowing us the privilege of being a part of your healthcare team! Please do not hesitate to contact our office at [phone] option 2 with any questions or concerns.

## 2025-07-09 ENCOUNTER — TELEPHONE (OUTPATIENT)
Age: 85
End: 2025-07-09

## 2025-07-09 DIAGNOSIS — I10 BENIGN ESSENTIAL HYPERTENSION: Primary | ICD-10-CM

## 2025-07-09 NOTE — PROGRESS NOTES
Tanesha Austin, APRN - NP  [facility] 400 Clinical Staff3 minutes ago (3:13 PM)       I'm ordering a renal US to see if she has renal artery stenosis and this is why her blood pressure has been so high despite all of the medications. There is no mention of it in the Vascular note     Called patient with no answer. Left  with [facility] call back number. Will await return call.

## 2025-07-09 NOTE — TELEPHONE ENCOUNTER
Spoke with patient using two patient identifiers. Reviewed with patient that NP Tanesha bermudez is recommend renal artery ultrasound for evaluation of cause of hypertension. Provided patient with care coordination number for scheduling. She states understanding and had no further questions.

## 2025-07-23 ENCOUNTER — HOSPITAL ENCOUNTER (OUTPATIENT)
Dept: VASCULAR SURGERY | Facility: HOSPITAL | Age: 85
Discharge: HOME | End: 2025-07-25
Payer: MEDICARE

## 2025-07-23 DIAGNOSIS — I10 BENIGN ESSENTIAL HYPERTENSION: ICD-10-CM

## 2025-07-23 PROCEDURE — 93975 VASCULAR STUDY: CPT

## 2025-07-23 RX ORDER — CLONIDINE 0.1 MG/24H
1 PATCH, EXTENDED RELEASE TRANSDERMAL
Qty: 12 PATCH | Refills: 1 | OUTPATIENT
Start: 2025-07-23

## 2025-07-23 RX ORDER — CLONIDINE 0.2 MG/24H
1 PATCH, EXTENDED RELEASE TRANSDERMAL WEEKLY
COMMUNITY

## 2025-07-23 NOTE — TELEPHONE ENCOUNTER
Clonidine refill refused as dose had been increased by patients nephrologist to 0.2mg patch. RN called pharmacy to confirm they had the prescription and to cancel out the 0.1 dose.

## 2025-07-24 LAB
VAS AORTA DIST AP: 1.21 CM
VAS AORTA MID AP: 1.31 CM
VAS AORTA MID PSV: 64.3 CM/S
VAS AORTA PROX AP: 1.29 CM
VAS LEFT KIDNEY LENGTH: 6.84 CM
VAS LEFT KIDNEY WIDTH: 3.27 CM
VAS LEFT RENAL MID EDV: 8.8 CM/S
VAS LEFT RENAL MID PSV: 56.7 CM/S
VAS LEFT RENAL MID RAR: 0.88
VAS LEFT RENAL MID RI: 0.84 NO UNITS
VAS LEFT RENAL MIDDLE PARENCHYMA EDV: 7 CM/S
VAS LEFT RENAL MIDDLE PARENCHYMA PSV: 55.3 CM/S
VAS LEFT RENAL MIDDLE PARENCHYMA RI: 0.87
VAS LEFT RENAL PROX EDV: 7.6 CM/S
VAS LEFT RENAL PROX PSV: 56.7 CM/S
VAS LEFT RENAL PROX RAR: 0.88
VAS LEFT RENAL PROX RI: 0.87 NO UNITS
VAS LEFT RENAL RAR: 0.88
VAS MID SMA EDV: 40.8 CM/S
VAS MID SMA PSV: 404.3 CM/S
VAS PROX SMA EDV: 39.3 CM/S
VAS PROX SMA PSV: 582.6 CM/S
VAS RIGHT KIDNEY LENGTH: 9.03 CM
VAS RIGHT KIDNEY WIDTH: 3.41 CM
VAS RIGHT RENAL DIST EDV: 8.7 CM/S
VAS RIGHT RENAL DIST PSV: 41.8 CM/S
VAS RIGHT RENAL DIST RAR: 0.65
VAS RIGHT RENAL DIST RI: 0.79 NO UNITS
VAS RIGHT RENAL LOWER PARENCHYMA EDV: 7.7 CM/S
VAS RIGHT RENAL LOWER PARENCHYMA PSV: 38 CM/S
VAS RIGHT RENAL LOWER PARENCHYMA RI: 0.8
VAS RIGHT RENAL MID EDV: 5.9 CM/S
VAS RIGHT RENAL MID PSV: 26.6 CM/S
VAS RIGHT RENAL MID RAR: 0.41
VAS RIGHT RENAL MID RI: 0.78 NO UNITS
VAS RIGHT RENAL MIDDLE PARENCHYMA EDV: 5.9 CM/S
VAS RIGHT RENAL MIDDLE PARENCHYMA PSV: 39.9 CM/S
VAS RIGHT RENAL MIDDLE PARENCHYMA RI: 0.85
VAS RIGHT RENAL PROX EDV: 20.3 CM/S
VAS RIGHT RENAL PROX PSV: 266.9 CM/S
VAS RIGHT RENAL PROX RAR: 4.15
VAS RIGHT RENAL PROX RI: 0.92 NO UNITS
VAS RIGHT RENAL RAR: 4.15
VAS RIGHT RENAL UPPER PARENCHYMA EDV: 5.9 CM/S
VAS RIGHT RENAL UPPER PARENCHYMA PSV: 29.5 CM/S
VAS RIGHT RENAL UPPER PARENCHYMA RI: 0.8

## 2025-07-28 ENCOUNTER — RESULTS FOLLOW-UP (OUTPATIENT)
Age: 85
End: 2025-07-28

## 2025-07-28 NOTE — TELEPHONE ENCOUNTER
----- Message from ZULMA Guillermo NP sent at 7/28/2025  1:06 PM EDT -----  I reviewed this for Tanesha, but had wanted to check in with Dr. Aparicio to see if anything else was needed right now.      The test does show some blood flow limitations to the kidneys, but they are borderline for nearing any intervention needs.  Dr. Aparicio does not think he needs a referral to vascular at this time.  We will continue with medications to decrease risk.   She is already on those medications.        Spoke with patient using two patient identifiers. Reviewed above information per GORDY José Np. Patient states understanding and had no further questions. Confirmed patients next appt with our office on 8/15/25.

## 2025-07-28 NOTE — RESULT ENCOUNTER NOTE
I reviewed this for Tanesha, but had wanted to check in with Dr. Aparicio to see if anything else was needed right now.      The test does show some blood flow limitations to the kidneys, but they are borderline for nearing any intervention needs.  Dr. Aparicio does not think he needs a referral to vascular at this time.  We will continue with medications to decrease risk.   She is already on those medications.

## 2025-08-06 ENCOUNTER — HOSPITAL ENCOUNTER (OUTPATIENT)
Facility: HOSPITAL | Age: 85
Discharge: HOME | End: 2025-08-09
Attending: INTERNAL MEDICINE
Payer: MEDICARE

## 2025-08-06 VITALS — BODY MASS INDEX: 13.53 KG/M2 | WEIGHT: 74 LBS

## 2025-08-06 DIAGNOSIS — I50.32 CHRONIC DIASTOLIC HEART FAILURE (HCC): ICD-10-CM

## 2025-08-06 PROCEDURE — 6360000004 HC RX CONTRAST MEDICATION: Performed by: INTERNAL MEDICINE

## 2025-08-06 PROCEDURE — A9579 GAD-BASE MR CONTRAST NOS,1ML: HCPCS | Performed by: INTERNAL MEDICINE

## 2025-08-06 PROCEDURE — 75561 CARDIAC MRI FOR MORPH W/DYE: CPT

## 2025-08-06 PROCEDURE — 75561 CARDIAC MRI FOR MORPH W/DYE: CPT | Performed by: INTERNAL MEDICINE

## 2025-08-06 RX ORDER — GADOTERIDOL 279.3 MG/ML
20 INJECTION INTRAVENOUS
Status: COMPLETED | OUTPATIENT
Start: 2025-08-06 | End: 2025-08-06

## 2025-08-06 RX ADMIN — GADOTERIDOL 20 ML: 279.3 INJECTION, SOLUTION INTRAVENOUS at 12:06

## 2025-08-13 ASSESSMENT — ENCOUNTER SYMPTOMS
SHORTNESS OF BREATH: 0
NAUSEA: 0
VOMITING: 0
COUGH: 0
TROUBLE SWALLOWING: 1
ABDOMINAL DISTENTION: 0

## 2025-08-15 ENCOUNTER — OFFICE VISIT (OUTPATIENT)
Age: 85
End: 2025-08-15

## 2025-08-15 VITALS
RESPIRATION RATE: 16 BRPM | HEART RATE: 65 BPM | SYSTOLIC BLOOD PRESSURE: 186 MMHG | HEIGHT: 61 IN | OXYGEN SATURATION: 97 % | BODY MASS INDEX: 13.86 KG/M2 | DIASTOLIC BLOOD PRESSURE: 66 MMHG | WEIGHT: 73.4 LBS

## 2025-08-15 DIAGNOSIS — I50.32 CHRONIC DIASTOLIC CONGESTIVE HEART FAILURE (HCC): ICD-10-CM

## 2025-08-15 DIAGNOSIS — I35.0 NONRHEUMATIC AORTIC VALVE STENOSIS: ICD-10-CM

## 2025-08-15 DIAGNOSIS — Z51.81 ENCOUNTER FOR MONITORING DIURETIC THERAPY: ICD-10-CM

## 2025-08-15 DIAGNOSIS — I1A.0 RESISTANT HYPERTENSION: ICD-10-CM

## 2025-08-15 DIAGNOSIS — Z79.899 ENCOUNTER FOR MONITORING DIURETIC THERAPY: ICD-10-CM

## 2025-08-15 DIAGNOSIS — I50.32 CHRONIC DIASTOLIC HEART FAILURE (HCC): Primary | ICD-10-CM

## 2025-08-15 LAB
ALBUMIN SERPL-MCNC: 4.3 G/DL (ref 3.5–5.2)
ALBUMIN/GLOB SERPL: 1.1 (ref 1.1–2.2)
ALP SERPL-CCNC: 108 U/L (ref 35–104)
ALT SERPL-CCNC: 19 U/L (ref 10–35)
ANION GAP SERPL CALC-SCNC: 14 MMOL/L (ref 2–14)
AST SERPL-CCNC: 34 U/L (ref 10–35)
BILIRUB SERPL-MCNC: 0.4 MG/DL (ref 0–1.2)
BUN SERPL-MCNC: 44 MG/DL (ref 8–23)
BUN/CREAT SERPL: 36 (ref 12–20)
CALCIUM SERPL-MCNC: 10.3 MG/DL (ref 8.8–10.2)
CHLORIDE SERPL-SCNC: 94 MMOL/L (ref 98–107)
CO2 SERPL-SCNC: 29 MMOL/L (ref 20–29)
CREAT SERPL-MCNC: 1.21 MG/DL (ref 0.6–1)
GLOBULIN SER CALC-MCNC: 3.9 G/DL (ref 2–4)
GLUCOSE SERPL-MCNC: 91 MG/DL (ref 65–100)
MAGNESIUM SERPL-MCNC: 2.4 MG/DL (ref 1.6–2.4)
NT PRO BNP: ABNORMAL PG/ML (ref 0–450)
POTASSIUM SERPL-SCNC: 3.4 MMOL/L (ref 3.5–5.1)
PROT SERPL-MCNC: 8.2 G/DL (ref 6.4–8.3)
SODIUM SERPL-SCNC: 137 MMOL/L (ref 136–145)

## 2025-08-15 RX ORDER — POTASSIUM CHLORIDE 750 MG/1
10 TABLET, EXTENDED RELEASE ORAL DAILY
COMMUNITY
Start: 2025-08-14

## 2025-08-15 RX ORDER — AMLODIPINE BESYLATE 2.5 MG/1
TABLET ORAL
Qty: 360 TABLET | Refills: 0 | Status: SHIPPED | OUTPATIENT
Start: 2025-08-15

## 2025-08-15 RX ORDER — ATENOLOL 25 MG/1
37.5 TABLET ORAL 2 TIMES DAILY
Qty: 90 TABLET | Refills: 0 | Status: SHIPPED | OUTPATIENT
Start: 2025-08-15 | End: 2025-09-14

## 2025-08-15 RX ORDER — MINOXIDIL 2.5 MG/1
2.5 TABLET ORAL DAILY
COMMUNITY
Start: 2025-08-14

## 2025-08-15 ASSESSMENT — PATIENT HEALTH QUESTIONNAIRE - PHQ9
SUM OF ALL RESPONSES TO PHQ QUESTIONS 1-9: 0
SUM OF ALL RESPONSES TO PHQ QUESTIONS 1-9: 0
1. LITTLE INTEREST OR PLEASURE IN DOING THINGS: NOT AT ALL
SUM OF ALL RESPONSES TO PHQ QUESTIONS 1-9: 0
SUM OF ALL RESPONSES TO PHQ QUESTIONS 1-9: 0
2. FEELING DOWN, DEPRESSED OR HOPELESS: NOT AT ALL

## 2025-08-19 ENCOUNTER — RESULTS FOLLOW-UP (OUTPATIENT)
Age: 85
End: 2025-08-19

## 2025-08-19 DIAGNOSIS — I50.32 CHRONIC DIASTOLIC CONGESTIVE HEART FAILURE (HCC): Primary | ICD-10-CM

## 2025-08-19 RX ORDER — SACUBITRIL AND VALSARTAN 24; 26 MG/1; MG/1
1 TABLET ORAL 2 TIMES DAILY
Qty: 60 TABLET | Refills: 1 | Status: SHIPPED | OUTPATIENT
Start: 2025-08-19

## 2025-08-22 ENCOUNTER — HOSPITAL ENCOUNTER (OUTPATIENT)
Facility: HOSPITAL | Age: 85
Discharge: HOME | End: 2025-08-24
Payer: MEDICARE

## 2025-08-22 VITALS
DIASTOLIC BLOOD PRESSURE: 62 MMHG | SYSTOLIC BLOOD PRESSURE: 209 MMHG | BODY MASS INDEX: 13.78 KG/M2 | HEIGHT: 61 IN | WEIGHT: 73 LBS

## 2025-08-22 DIAGNOSIS — I35.0 NONRHEUMATIC AORTIC VALVE STENOSIS: ICD-10-CM

## 2025-08-22 DIAGNOSIS — I50.32 CHRONIC DIASTOLIC CONGESTIVE HEART FAILURE (HCC): ICD-10-CM

## 2025-08-22 LAB
ECHO AO ARCH DIAM: 2.1 CM
ECHO AO ASC DIAM: 2.3 CM
ECHO AO ASCENDING AORTA INDEX: 1.87 CM/M2
ECHO AO ROOT DIAM: 2.4 CM
ECHO AO ROOT INDEX: 1.95 CM/M2
ECHO AR MAX VEL PISA: 4.4 M/S
ECHO AV AREA PEAK VELOCITY: 0.6 CM2
ECHO AV AREA VTI: 0.6 CM2
ECHO AV AREA/BSA PEAK VELOCITY: 0.5 CM2/M2
ECHO AV AREA/BSA VTI: 0.5 CM2/M2
ECHO AV MEAN GRADIENT: 11 MMHG
ECHO AV MEAN VELOCITY: 1.6 M/S
ECHO AV PEAK GRADIENT: 21 MMHG
ECHO AV PEAK VELOCITY: 2.3 M/S
ECHO AV REGURGITANT PHT: 418.8 MS
ECHO AV VELOCITY RATIO: 0.26
ECHO AV VTI: 60.9 CM
ECHO BSA: 1.19 M2
ECHO EST RA PRESSURE: 8 MMHG
ECHO LA DIAMETER INDEX: 2.28 CM/M2
ECHO LA DIAMETER: 2.8 CM
ECHO LA TO AORTIC ROOT RATIO: 1.17
ECHO LA VOL A-L A2C: 39 ML (ref 22–52)
ECHO LA VOL A-L A4C: 38 ML (ref 22–52)
ECHO LA VOL BP: 36 ML (ref 22–52)
ECHO LA VOL MOD A2C: 38 ML (ref 22–52)
ECHO LA VOL MOD A4C: 34 ML (ref 22–52)
ECHO LA VOL/BSA BIPLANE: 29 ML/M2 (ref 16–34)
ECHO LA VOLUME AREA LENGTH: 38 ML
ECHO LA VOLUME INDEX A-L A2C: 32 ML/M2 (ref 16–34)
ECHO LA VOLUME INDEX A-L A4C: 31 ML/M2 (ref 16–34)
ECHO LA VOLUME INDEX AREA LENGTH: 31 ML/M2 (ref 16–34)
ECHO LA VOLUME INDEX MOD A2C: 31 ML/M2 (ref 16–34)
ECHO LA VOLUME INDEX MOD A4C: 28 ML/M2 (ref 16–34)
ECHO LV E' LATERAL VELOCITY: 3.69 CM/S
ECHO LV E' SEPTAL VELOCITY: 3.05 CM/S
ECHO LV EDV A2C: 68 ML
ECHO LV EDV A4C: 47 ML
ECHO LV EDV BP: 64 ML (ref 56–104)
ECHO LV EDV INDEX A4C: 38 ML/M2
ECHO LV EDV INDEX BP: 52 ML/M2
ECHO LV EDV NDEX A2C: 55 ML/M2
ECHO LV EF PHYSICIAN: 55 %
ECHO LV EJECTION FRACTION A2C: 66 %
ECHO LV EJECTION FRACTION A4C: 49 %
ECHO LV EJECTION FRACTION BIPLANE: 61 % (ref 55–100)
ECHO LV ESV A2C: 24 ML
ECHO LV ESV A4C: 24 ML
ECHO LV ESV BP: 25 ML (ref 19–49)
ECHO LV ESV INDEX A2C: 20 ML/M2
ECHO LV ESV INDEX A4C: 20 ML/M2
ECHO LV ESV INDEX BP: 20 ML/M2
ECHO LV FRACTIONAL SHORTENING: 30 % (ref 28–44)
ECHO LV INTERNAL DIMENSION DIASTOLE INDEX: 3.25 CM/M2
ECHO LV INTERNAL DIMENSION DIASTOLIC: 4 CM (ref 3.9–5.3)
ECHO LV INTERNAL DIMENSION SYSTOLIC INDEX: 2.28 CM/M2
ECHO LV INTERNAL DIMENSION SYSTOLIC: 2.8 CM
ECHO LV IVSD: 1.3 CM (ref 0.6–0.9)
ECHO LV MASS 2D: 175.8 G (ref 67–162)
ECHO LV MASS INDEX 2D: 143 G/M2 (ref 43–95)
ECHO LV POSTERIOR WALL DIASTOLIC: 1.2 CM (ref 0.6–0.9)
ECHO LV RELATIVE WALL THICKNESS RATIO: 0.6
ECHO LVOT AREA: 2.3 CM2
ECHO LVOT AV VTI INDEX: 0.26
ECHO LVOT DIAM: 1.7 CM
ECHO LVOT MEAN GRADIENT: 1 MMHG
ECHO LVOT PEAK GRADIENT: 2 MMHG
ECHO LVOT PEAK VELOCITY: 0.6 M/S
ECHO LVOT STROKE VOLUME INDEX: 29.5 ML/M2
ECHO LVOT SV: 36.3 ML
ECHO LVOT VTI: 16 CM
ECHO MV A VELOCITY: 0.87 M/S
ECHO MV E DECELERATION TIME (DT): 157.2 MS
ECHO MV E VELOCITY: 0.74 M/S
ECHO MV E/A RATIO: 0.85
ECHO MV E/E' LATERAL: 20.05
ECHO MV E/E' RATIO (AVERAGED): 22.16
ECHO MV E/E' SEPTAL: 24.26
ECHO MV EROA PISA: 0.1 CM2
ECHO MV REGURGITANT ALIASING (NYQUIST) VELOCITY: 32 CM/S
ECHO MV REGURGITANT PEAK VELOCITY: 6.1 M/S
ECHO MV REGURGITANT PEAK VELOCITY: 6.7 M/S
ECHO MV REGURGITANT RADIUS PISA: 0.46 CM
ECHO MV REGURGITANT VELOCITY PISA: 6.8 M/S
ECHO MV REGURGITANT VTIA: 203.7 CM
ECHO MV REGURGITANT VTIA: 211.1 CM
ECHO PULMONARY ARTERY END DIASTOLIC PRESSURE: 13 MMHG
ECHO PV MAX VELOCITY: 1.7 M/S
ECHO PV PEAK GRADIENT: 11 MMHG
ECHO PV REGURGITANT MAX VELOCITY: 1.8 M/S
ECHO RIGHT VENTRICULAR SYSTOLIC PRESSURE (RVSP): 42 MMHG
ECHO RV FREE WALL PEAK S': 8.5 CM/S
ECHO RV INTERNAL DIMENSION: 2.8 CM
ECHO RV TAPSE: 1.6 CM (ref 1.7–?)
ECHO TV REGURGITANT MAX VELOCITY: 2.92 M/S
ECHO TV REGURGITANT PEAK GRADIENT: 34 MMHG

## 2025-08-22 PROCEDURE — 93306 TTE W/DOPPLER COMPLETE: CPT

## 2025-08-22 PROCEDURE — 93306 TTE W/DOPPLER COMPLETE: CPT | Performed by: STUDENT IN AN ORGANIZED HEALTH CARE EDUCATION/TRAINING PROGRAM

## 2025-08-25 ENCOUNTER — TELEPHONE (OUTPATIENT)
Age: 85
End: 2025-08-25

## 2025-08-26 ENCOUNTER — TELEPHONE (OUTPATIENT)
Age: 85
End: 2025-08-26

## 2025-08-26 DIAGNOSIS — I50.32 CHRONIC DIASTOLIC CONGESTIVE HEART FAILURE (HCC): Primary | ICD-10-CM

## 2025-08-26 DIAGNOSIS — I35.0 AORTIC VALVE STENOSIS, ETIOLOGY OF CARDIAC VALVE DISEASE UNSPECIFIED: ICD-10-CM
